# Patient Record
Sex: FEMALE | Race: WHITE | NOT HISPANIC OR LATINO | URBAN - METROPOLITAN AREA
[De-identification: names, ages, dates, MRNs, and addresses within clinical notes are randomized per-mention and may not be internally consistent; named-entity substitution may affect disease eponyms.]

---

## 2021-11-02 ENCOUNTER — TRANSFERRED RECORDS (OUTPATIENT)
Dept: HEALTH INFORMATION MANAGEMENT | Facility: CLINIC | Age: 56
End: 2021-11-02
Payer: OTHER GOVERNMENT

## 2021-11-12 ENCOUNTER — TRANSCRIBE ORDERS (OUTPATIENT)
Dept: OTHER | Age: 56
End: 2021-11-12
Payer: OTHER GOVERNMENT

## 2021-11-12 ENCOUNTER — TELEPHONE (OUTPATIENT)
Dept: GASTROENTEROLOGY | Facility: CLINIC | Age: 56
End: 2021-11-12
Payer: OTHER GOVERNMENT

## 2021-11-12 NOTE — TELEPHONE ENCOUNTER
[facility] Call Center    Phone Message    May a detailed message be left on voicemail: yes     Reason for Call: Other:     Nate is calling back trying to talk to Brandie again on Dr Lacy team.  Please call back when you are able.    Action Taken: Message routed to:  [facility] ([facility]): kip umanzor    Travel Screening: Not Applicable

## 2021-11-12 NOTE — TELEPHONE ENCOUNTER
Health Call Center    Phone Message    May a detailed message be left on voicemail: yes     Reason for Call: Other: Brian Schoenberger PA is a hospitalist in [place] and he called to set up an appt for patient with Dr. Almonte for acute pancreatitis.  He stated that Dr. Almonte is aware of patient.  Please follow up with him at [phone] for scheduling, ASAP, per Nate.  Thank you.     Action Taken: Message routed to:  [facility]): Bella Black Team     Travel Screening: Not Applicable

## 2021-11-12 NOTE — TELEPHONE ENCOUNTER
Returned call to PA wanting ASAP case. Gage is talking to current attending Dr. Muller     Returned call to Jono, directing him to our referral line, number provided.      Paulbon says he's discharged her today with on a full liquid diet and a PICC line due to pancreatitis. Will watch for referral.    ML

## 2021-11-12 NOTE — TELEPHONE ENCOUNTER
[facility]     Referring provider: [facility], Brian Schoeneberger   P: [phone]    Referred to: [facility] Provider [facility]     Provider Requested: Gage discussed with Nate while attending, please discuss with current on-service provider     Referral Received: 11/12/21     Records received: CareEverywhere  Lipase > 30,000 on 11/6, 313 on 11/12    MRCP 11/9/21  IMPRESSION:   1.  No intrahepatic or extrahepatic biliary duct dilation.   2.  Small amount of fluid left adjacent to the left hepatic lobe and in the   gallbladder fossa.  Could be due to postoperative seroma or infection or   bile leak.  These fluid collections are unchanged compared to CT from   November 03, 2021.    CT Abd/pelvis 11/3/21  IMPRESSION:   1.  Increased fluid in the gallbladder fossa and along the inferior margin   of the left hepatic lobe. There is a surgical drain which does not appear   to go deep enough to address this accumulating fluid    CT abd/pelvis 11/2/21  IMPRESSION:   1.  A surgical drain is seen in the region of the candido hepatis.  There is   mild adjacent inflammation   2.  New moderate amount of free fluid in the right lower quadrant adjacent   to the small bowel loops and ovary.     Images received: PACs    Evaluation for: Recurrent pancreatitis     Clinical History (per RN review):   Joanna Tavera  is a 56-year-old female who presented to the [facility] emergency department on 11/1 with complaints of epigastric abdominal pain and nausea.  Additionally, she noted diarrhea at that time but that has resolved since. Of significance, the patient recently was admitted to [place] from 10/22-10/26 due to acute gallstone pancreatitis requiring a laparoscopic cholecystectomy by Dr. Poe on 10/24. Findings from that operation revealed distended and minimally inflamed gallbladder containing small stones, severe fatty liver and inflammation limiting visibility with subtotal cholecystectomy  performed.  Gastroenterology, Dr. Clayton, was consulted however due to the patient's history of Alon-en-Y bypass and an ERCP was unable to be completed.  Post procedure, the patient was noted to have LFTs and lipase trending down and the diet was advanced without any serious complications.  BLANQUITA drain remained in place at the time of discharge and is currently still in place with serious fluid in the bulb and on the dressing.  Additionally, she was discharged on Ceftin and metronidazole for a total of 14 days antibiotic therapy. Since discharge patient had several bouts of recurrent pancreatitis when trying to eat solid foods.    She overall feels rather comfortable right now, but understands with her recurrent bouts that we are awaiting transfer to [facility] for specialized ERCP given her history of Alon-en-Y bypass.    -Antibiotic coverage with PO Clindamycin day #5.  -Lipase has normalized.  -LFT have been trending down/normalizing.  -[facility],  Dr. Almonte has accepted patient for specialized ERCP intervention. Phone #[phone] for transfer line. Admitting hospitalist Dr. Duncan.  familiar with case is Archana. CT of abdomen, abdominal US, & MRCP images were all pushed to [facility].  -Placed on wait-list successfully 11/9/21. Did call today to see if any beds; awaiting call back.    MD review date:   MD Decision for clinic consultation/Orders:            Referral updates/Patient contacted:

## 2021-11-16 ENCOUNTER — PATIENT OUTREACH (OUTPATIENT)
Dept: GASTROENTEROLOGY | Facility: CLINIC | Age: 56
End: 2021-11-16
Payer: OTHER GOVERNMENT

## 2021-11-16 NOTE — PROGRESS NOTES
Called pt to discuss referral and Dr Almonte's recommendations for clinic visit. Pt in agreement with this plan, would like to schedule in person visit on 1/12   Message sent to clinic coordinators    Jennifer Matthew RN, BSN,   [facility]  Care coordinator

## 2021-11-23 ENCOUNTER — TELEPHONE (OUTPATIENT)
Dept: GASTROENTEROLOGY | Facility: CLINIC | Age: 56
End: 2021-11-23
Payer: OTHER GOVERNMENT

## 2022-01-07 ENCOUNTER — PATIENT OUTREACH (OUTPATIENT)
Dept: GASTROENTEROLOGY | Facility: CLINIC | Age: 57
End: 2022-01-07
Payer: OTHER GOVERNMENT

## 2022-01-07 NOTE — PROGRESS NOTES
Called pt to discuss upcoming visit with Dr Almonte on 1/12. Pt asked if it could be changed to virtual visit, will move her visit up to 7:40am to accommodate this request. Clinic coordinator contacted to assist with time change    Jennifer Matthew RN, BSN,   [facility]  Care coordinator

## 2022-01-10 NOTE — TELEPHONE ENCOUNTER
RECORDS STATUS - ALL OTHER DIAGNOSIS      RECORDS RECEIVED FROM: [facility]   DATE RECEIVED: 1/10   NOTES STATUS DETAILS   OFFICE NOTE from referring provider CE - [facility] 12/10/21   OFFICE NOTE from medical oncologist NA    DISCHARGE SUMMARY from hospital CE - [facility] 11/6/21, 11/1/21, 10/22/21   DISCHARGE REPORT from the ER CE - [facility] 10/22/21   OPERATIVE REPORT CE - [facility] 10/24/21: LAPAROSCOPIC CHOLECYSTECTOMY  5/7/13: Alon-en-Y gastric bypass anatomy   MEDICATION LIST [facility]   CLINICAL TRIAL TREATMENTS TO DATE     LABS     PATHOLOGY REPORTS [facility] 10/24/21: [id number]  5/7/13: [id number]   ANYTHING RELATED TO DIAGNOSIS Epic/CE 11/18/21   GENONOMIC TESTING     TYPE:     IMAGING (NEED IMAGES & REPORT)     CT SCANS PACS 11/3/21, 11/2/21, 10/22/21: [facility]   MRI PACS 11/9/21: [facility]   MAMMO     ULTRASOUND PACS 11/1/21: [facility]   PET

## 2022-01-12 ENCOUNTER — VIRTUAL VISIT (OUTPATIENT)
Dept: GASTROENTEROLOGY | Facility: CLINIC | Age: 57
End: 2022-01-12
Attending: INTERNAL MEDICINE
Payer: OTHER GOVERNMENT

## 2022-01-12 ENCOUNTER — PRE VISIT (OUTPATIENT)
Dept: GASTROENTEROLOGY | Facility: CLINIC | Age: 57
End: 2022-01-12
Payer: OTHER GOVERNMENT

## 2022-01-12 DIAGNOSIS — K85.10 ACUTE BILIARY PANCREATITIS WITHOUT INFECTION OR NECROSIS: ICD-10-CM

## 2022-01-12 DIAGNOSIS — K85.90 ACUTE PANCREATITIS: Primary | ICD-10-CM

## 2022-01-12 PROCEDURE — 99204 OFFICE O/P NEW MOD 45 MIN: CPT | Mod: 95 | Performed by: INTERNAL MEDICINE

## 2022-01-12 PROCEDURE — G0463 HOSPITAL OUTPT CLINIC VISIT: HCPCS | Mod: PN,RTG | Performed by: INTERNAL MEDICINE

## 2022-01-12 RX ORDER — ENALAPRIL MALEATE 20 MG/1
TABLET ORAL
COMMUNITY
Start: 2021-12-22

## 2022-01-12 RX ORDER — METOPROLOL SUCCINATE 100 MG/1
TABLET, EXTENDED RELEASE ORAL
COMMUNITY
Start: 2021-12-22

## 2022-01-12 RX ORDER — AMLODIPINE BESYLATE 10 MG/1
TABLET ORAL
COMMUNITY
Start: 2021-12-29

## 2022-01-12 RX ORDER — CLINDAMYCIN HCL 300 MG
CAPSULE ORAL
COMMUNITY
Start: 2021-11-06

## 2022-01-12 RX ORDER — LEVOTHYROXINE SODIUM 112 UG/1
TABLET ORAL
COMMUNITY
Start: 2021-12-22

## 2022-01-12 NOTE — LETTER
Date:January 27, 2022      Patient was self referred, no letter generated. Do not send.        [facility] Health Information

## 2022-01-12 NOTE — NURSING NOTE
Patient verified meds and allergies are correct via patients echeck in.    Herberth Davenport, Virtual Facilitator

## 2022-01-12 NOTE — LETTER
1/12/2022         RE: Joanna Tavera  [address] [place] [id number]        Dear Colleague,    Thank you for referring your patient, Joanna Tavera, to the [facility]. Please see a copy of my visit note below.    Mckenzie is a 56 year old who is being evaluated via a billable video visit.      How would you like to obtain your AVS? MyChart  If the video visit is dropped, the invitation should be resent by: Text to cell phone: [phone]   Will anyone else be joining your video visit? Yes: Alisa. How would they like to receive their invitation? Text to cell phone: in person      Video Start Time: 7:44 AM  Video-Visit Details    Type of service:  Video Visit    Video End Time:8:08 AM    Originating Location (pt. Location): Home    Distant Location (provider location):  [facility]     Platform used for Video Visit: [facility]     INTERVENTIONAL ENDOSCOPY OUTPATIENT CLINIC CONSULT  DATE OF SERVICE: 1/12/2022  PROVIDER REQUESTING CONSULT: [facility], Brian Schoeneberger  Reason for Consultation: h/o acute biliary pancreatitis; steatorrhea, early satiety     ASSESSMENT:  Mckenzie is a 56 year old female with a PMhx of obesity s/p RYGB in 2013, ALCIDES, who was referred for follow up after an single episode of acute biliary pancreatitis following a cholecystectomy now complaining of steatorrhea, early satiety and lower abdominal discomfort. I reviewed her hospital course at [facility], her labs, and MRI/MRCP. Her presentation there was consistent with a dropped stone/sludge after her cholecystectomy resulting in biliary pancreatitis. Her MRI/MRCP did not show any retained choledocholithiasis or CBD sludge. I measure her CBD at that time to be 4 mm. Therefore there is no indication for an ERCP for any biliary intervention/prevent recurrence.     Her residual symptoms could be that of pancreatic insufficiency or potentially bile salt diarrhea following  cholecystectomy. Single episodes of severe acute interstitial pancreatitis can result in chronic pancreatitis changes. Alternatively, pancreatic fluid collections following a bout of pancreatitis can also result in pancreatic insufficiency due to obstruction. Her imaging did not show pancreatic necrosis so it seems less likely. We'll obtain a fecal elastase and a secretin-MRI/MRCP to assess for the above. If no signs of pancreatic insufficiency, then will do empiric cholestyramine therapy.     RECOMMENDATIONS:  - Fecal elastase, CBC, CMP  - secretin-MRI/MRCP   - RTC afterwards    Thank you for this consultation.  It was a pleasure to participate in the care of this patient; please contact us with any further questions.  A total of 45 minutes was spent in evaluation with this patient, of which was included chart review, history and exam, documentation, counseling, coordinating a management plan and further activities as noted above for this patient on the date of the encounter.     Marco Almonte MD  [facility]  Division of Gastroenterology and Hepatology  [facility] [address] [address]    ________________________________________________________________  HPI:  Mckenzie is a 56 year old female with a PMhx of obesity s/p RYGB in 2013, OSA, who was referred for follow up after an episode of acute biliary pancreatitis following a cholecystectomy.  She underwent a cholecystectomy on October 24, 2021.  It was apparently a partial cholecystectomy presumably because of inflammation.  She was readmitted to [facility] in [place] on November 6 with severe abdominal pain and lipase greater than 30,000.  Her course was lengthy and she had a roughly 2-week hospital stay.  She had some mildly elevated LFTs initially.  She had an MRI MRCP that showed a normal common bile duct and no evidence of choledocholithiasis.  She was ultimately discharged with near normal  transaminases and a mildly elevated alkaline phosphatase to 160. At this point over two months later, she is mostly pain free but notes oily stools, early satiety, and at time lower abdominal discomfort. Her main complaint is that of 4-5 BMs per day that are loose and greasy. This is her only episode of acute pancreatitis. Her weight is stable. Denies any jaundice, fevers. She does report that she has memory issues at times.     PMHx:    Anemia, normocytic normochromic     Disease of thyroid gland     Hypertension     Obesity     ALCIDES (obstructive sleep apnea)       PSurgHx:    APPENDECTOMY     CHOLECYSTECTOMY     COLONOSCOPY age 47   polyps     COLONOSCOPY 20/2021   [facility]     GASTRIC BYPASS,OBESE<100CM CHRISTINA-EN-Y 05/07/2013   Dr. Turpin [facility], done at [facility]     HYSTERECTOMY     JOINT REPLACEMENT       MEDS:  Current Outpatient Medications   Medication     amLODIPine (NORVASC) 10 MG tablet     cholecalciferol 25 MCG (1000 UT) TABS     clindamycin (CLEOCIN) 300 MG capsule     enalapril (VASOTEC) 20 MG tablet     FLUoxetine (PROZAC) 20 MG capsule     levothyroxine (SYNTHROID/LEVOTHROID) 112 MCG tablet     metoprolol succinate ER (TOPROL-XL) 100 MG 24 hr tablet     omeprazole (PRILOSEC) 20 MG DR capsule     No current facility-administered medications for this visit.     ALLERGIES:    Allergies   Allergen Reactions     Penicillins Hives, Rash and Swelling     Hand and feet swelling       Glipizide GI Disturbance and Nausea and Vomiting     FHx: no family history of pancreatitis; Aunt with pancreatic cancer    SOCIAL Hx:  Social History     Socioeconomic History     Marital status:      Spouse name: Not on file     Number of children: Not on file     Years of education: Not on file     Highest education level: Not on file   Occupational History     Not on file   Tobacco Use     Smoking status: Not on file     Smokeless tobacco: Not on file   Substance and Sexual Activity     Alcohol use: Not on  file     Drug use: Not on file     Sexual activity: Not on file   Other Topics Concern     Not on file   Social History Narrative     Not on file     Social Determinants of Health     Financial Resource Strain: Not on file   Food Insecurity: Not on file   Transportation Needs: Not on file   Physical Activity: Not on file   Stress: Not on file   Social Connections: Not on file   Intimate Partner Violence: Not on file   Housing Stability: Not on file       ROS: A comprehensive Review of Systems was asked and answered in the negative unless specifically commented upon in the HPI    Physical Exam  Gen: A&Ox3, NAD  HEENT: Moist mucus membranes, no scleral icterus.  Lungs: no respiratory distress      LABS:    Component 11/18/2021 11/18/2021 11/12/2021 11/12/2021 11/11/2021 11/11/2021 11/10/2021 11/10/2021 11/10/2021 11/09/2021 11/09/2021 11/07/2021 11/07/2021 11/06/2021 11/06/2021 11/06/2021 11/06/2021 11/06/2021 11/06/2021 11/05/2021 11/05/2021 11/05/2021 11/04/2021 11/04/2021 11/03/2021 11/03/2021 11/03/2021 11/03/2021 11/03/2021 11/02/2021 11/01/2021 11/01/2021 10/26/2021 10/26/2021 10/26/2021 10/25/2021 10/25/2021 10/24/2021 10/24/2021 10/23/2021 10/23/2021 10/23/2021 10/23/2021 10/22/2021 10/22/2021                                                   SODIUM 138 -- 139 -- 141 -- 142 -- -- -- 140 144 -- 140 -- -- -- 141 -- -- 139 -- -- 142 -- 141 -- -- 142 137 139 -- 141 -- -- 139 -- 138 -- 139 -- -- -- 142 --   POTASSIUM 5.1 -- 4.6 -- 3.8 -- 3.9 -- -- -- 3.8 4.2 -- 3.5 -- 4.1 -- 3.5 -- -- 3.5 -- -- 3.9 -- 3.9 4.5 -- 5.7 High     5.4 High     3.4 Low     -- 4.5 -- -- 4.3 -- 4.1 -- 4.3 -- -- -- 4.0 --   CHLORIDE S/P/B 101 -- 105 -- 105 -- 106 -- -- -- 105 107 -- 106 -- -- -- 105 -- -- 107 -- -- 110 High     -- 108 High     -- -- 111 High     108 High     106 -- 113 High     -- -- 112 High     -- 111 High     -- 110 High     -- -- -- 109 High     --   CO2 28 -- 27.3 -- 25.4 -- 27.4 -- -- -- 26.3 26.9 -- 26.5 -- -- -- 27.7  -- -- 25.5 -- -- 23.0 -- 22.9 -- -- 21.7 20.6 Low     24.9 -- 27.0 -- -- 23.0 -- 23.0 -- 26.0 -- -- -- 25.2 --   ALBUMIN S/P/B 3.6 -- 2.5 Low     -- 2.5 Low     -- 2.6 Low     -- -- -- 2.4 Low     -- -- 2.6 Low     -- -- -- 2.3 Low     -- -- 2.3 Low     -- -- 2.5 Low     -- 2.7 Low     -- -- 2.3 Low     2.9 Low     3.1 -- 2.3 Low     -- -- 2.6 Low     -- 2.6 Low     -- 2.6 Low     -- 2.6 Low     -- 3.1 --   ALKALINE PHOSPHATASE S/P/B 160 High     -- 201 High     -- 221 High     -- 265 High     -- -- -- 276 High     -- -- 461 High     -- -- -- 145 High     -- -- 161 High     -- -- 183 High     -- 209 High     -- -- 315 High     439 High     466 High     -- 107 -- -- 134 High     -- 144 High     -- 106 -- 106 -- 114 --   AST 45 High     -- 29 -- 28 -- 32 -- -- -- 48 High     -- -- 319 High     -- -- -- 28 -- -- 30 -- -- 44 High     -- 59 High     -- -- 126 High     317 High     526 High     -- 52 High     -- -- 96 High     -- 166 High     -- 164 High     -- 164 High     -- 162 High     --   ALT 30 -- 38 -- 38 -- 51 -- -- -- 60 High     -- -- 112 High     -- -- -- 43 -- -- 54 -- -- 72 High     -- 92 High     -- -- 135 High     199 High     204 High     -- 78 High     -- -- 121 High     -- 154 High     -- 118 High     -- 118 High     -- 82 High     --   BILIRUBIN TOTAL S/P/B 0.5 -- 0.2 -- 0.3 -- 0.4 -- -- -- 0.4 -- -- 1.2 High     -- -- -- 0.6 -- -- 0.6 -- -- 0.5 -- 0.6 -- -- 0.7 1.2 High     1.2 High     -- 0.4 -- -- 0.4 -- 0.8 -- 1.8 High     -- 1.8 High     -- 0.7 --   BUN 16 -- 19 High     -- 15 -- 8 -- -- -- 5 Low     11 -- 10 -- -- -- 8 -- -- 8 -- -- 15 -- 19 High     -- -- 22 High     18 16 -- 6 Low     -- -- 9 -- 7 -- 11 -- -- -- 16 --   CALCIUM 9.4 -- 8.4 Low     -- 8.0 Low     -- 8.9 -- -- -- 7.9 Low     8.3 Low     -- 8.3 Low     -- -- -- 7.8 Low     -- -- 7.7 Low     -- -- 7.6 Low     -- 7.5 Low     -- -- 7.7 Low     7.9 Low     8.5 -- 7.8 Low     -- -- 7.9 Low     -- 7.8 Low     -- 8.1 Low     -- -- -- 8.6  --   CREATININE S/P/B 0.80 -- 0.74 -- 0.75 -- 0.83 -- -- -- 0.73 0.88 -- 0.90 -- -- -- 0.66 -- -- 0.66 -- -- 0.75 -- 0.91 -- -- 1.22 High     1.06 High     1.03 High     -- 0.70 -- -- 0.66 -- 0.58 -- 0.82 -- -- -- 0.86 --   GLUCOSE 95 -- 132 High      -- 132 High      -- 143 High      -- -- -- 86 99 -- 114 High      -- -- -- 93 -- -- 87 -- -- 85 -- 99 -- -- 124 High      200 High      186 High      -- 76 -- -- 92 -- 75 -- 87 -- -- -- 98 --   TOTAL PROTEIN S/P/B 6.6 -- 6.7 -- 6.5 -- 6.9 -- -- -- 6.2 Low     -- -- 6.6 -- -- -- 6.0 Low     -- -- 5.8 Low     -- -- 5.7 Low     -- 5.9 Low     -- -- 6.1 Low     6.7 6.9 -- 5.5 Low     -- -- 6.1 Low     -- 6.0 Low     -- 6.1 Low     -- 6.1 Low     -- 6.8 --   ANION GAP 14 -- 6.7 -- 10.6 -- 8.6 -- -- -- 8.7 10.1 -- 7.5 -- -- -- 8.3 -- -- 6.5 -- -- 9.0 -- 10.1 -- -- 9.3 8.4 8.1 -- 1.0 Low     -- -- 4.0 Low     -- 4.0 Low     -- 3.0 Low     -- -- -- 7.8 --   eGFR Non-Afr. Amer. -- -- >90 -- >90 -- 80 Low     -- -- -- >90 74 Low     -- 72 Low     -- -- -- >90 -- -- >90 -- -- >90 -- 72 Low     -- -- 50 Low     59 Low     62 Low     -- >90 -- -- >90 -- >90 -- 81 Low     -- -- -- 77 Low     --   eGFR Afr. Amer. -- -- >90 -- >90 -- >90 -- -- -- >90 86 Low      -- 84 Low      -- -- -- >90 -- -- >90 -- -- >90 -- 83 Low      -- -- 58 Low      69 Low      71 Low      -- >90 -- -- >90 -- >90 -- >90 -- -- -- 89 Low      --   LIPASE -- 387 -- 313 -- 272 -- -- -- 268 -- -- 8056 High     -- >67387 High     -- 355 -- -- 363 -- -- 424 High     -- -- -- -- 3242 High     -- -- -- >45550 High     -- -- -- -- -- -- 714 High     -- -- -- 3579 High     -- >29287 High       MAGNESIUM -- -- -- -- -- -- -- -- 2.1 -- -- -- -- -- -- -- -- -- 1.9 -- -- -- -- -- -- -- -- -- -- -- -- -- -- 1.9 -- -- 1.9 -- -- -- 2.2 -- -- -- --   BILIRUBIN DIRECT S/P/B -- -- -- -- -- -- -- -- -- -- -- -- -- -- -- -- -- -- -- -- -- -- -- -- -- -- -- -- -- -- -- -- -- -- -- -- -- -- -- -- -- 1.3 High     -- -- --   PHOSPHORUS  -- -- -- -- -- -- -- 4.7 -- -- -- -- -- -- -- -- -- -- -- -- -- -- -- -- -- -- -- -- -- -- -- -- -- -- 2.4 Low     -- -- -- -- -- -- -- -- -- --   AMMONIA -- -- -- -- -- -- -- -- -- -- -- -- -- -- -- -- -- -- -- -- -- -- -- -- <10 Low     -- -- -- -- -- -- -- -- -- -- -- -- -- -- -- -- -- -- -- --   CALCIUM, IONIZED -- -- -- -- -- -- -- -- -- -- -- -- -- -- -- -- -- -- -- -- -- 4.6 -- -- -- -- -- -- -- -- -- -- -- -- -- -- -- -- -- -- -- -- -- -- --   GFR ESTIMATE 79 -- -- -- -- -- -- -- -- -- -- -- -- -- -- -- -- -- -- -- -- -- -- -- -- -- -- -- -- -- -- -- -- -- -- -- -- -- -- -- -- -- -- -- --   BUN CREATININE RATIO 20.00 -- --                                                   IMAGING:  CT ABDOMEN AND PELVIS WITH CONTRAST     Clinical indication: History of hysterectomy with increased output from the   patient's surgical drain.     Technique: Automated exposure control was utilized to reduce radiation   dose.  The patient received an IV dose of 100 cc [id number].     Comparison is made to a prior examination November 2, 2021.     FINDINGS: A surgical drain is identified terminating near the gallbladder   fossa. The drain appears to terminate prior to an area of inflammation and   a small volume of fluid seen in the gallbladder fossa along the inferior   margin of the liver. This fluid collection has mildly increased from the   previous study.     There is trace free fluid in the pelvis which has decreased from the prior   study. The bowel is normal in course and caliber.     The lung bases demonstrate atelectasis which is similar to the prior study.   The spleen, liver parenchyma, pancreas, and adrenal glands are   unremarkable. The kidneys are symmetric in size and enhancement with no   hydronephrosis.     IMPRESSION:     1.  Increased fluid in the gallbladder fossa and along the inferior margin   of the left hepatic lobe. There is a surgical drain which does not appear   to go deep enough to address this  accumulating fluid.        MRCP     INDICATION:  Status post laparoscopic cholecystectomy.  Evaluate for   retained stone.     COMPARISON: CT abdomen from November 03, 2021.     TECHNIQUE: T1, T2, diffusion and MRCP images are obtained of the abdomen.     FINDINGS: A small amount of fluid adjacent to the left hepatic lobe and in   the gallbladder fossa.     Liver: No discrete hepatic mass identified.     Spleen: Normal.     Pancreas: Normal.     Kidneys: Small right renal cortical cyst.     Bile ducts: Intrahepatic and extrahepatic bile ducts not dilated.  No   filling defects identified.     IMPRESSION:     1.  No intrahepatic or extrahepatic biliary duct dilation.   2.  Small amount of fluid left adjacent to the left hepatic lobe and in the   gallbladder fossa.  Could be due to postoperative seroma or infection or   bile leak.  These fluid collections are unchanged compared to CT from   November 03, 2021.            Again, thank you for allowing me to participate in the care of your patient.        Sincerely,        Marco Almonte MD

## 2022-01-12 NOTE — PROGRESS NOTES
Mckenzie is a 56 year old who is being evaluated via a billable video visit.      How would you like to obtain your AVS? MyChart  If the video visit is dropped, the invitation should be resent by: Text to cell phone: [phone]   Will anyone else be joining your video visit? Yes: Alisa. How would they like to receive their invitation? Text to cell phone: in person      Video Start Time: 7:44 AM  Video-Visit Details    Type of service:  Video Visit    Video End Time:8:08 AM    Originating Location (pt. Location): Home    Distant Location (provider location):  [facility]     Platform used for Video Visit: [facility]     INTERVENTIONAL ENDOSCOPY OUTPATIENT CLINIC CONSULT  DATE OF SERVICE: 1/12/2022  PROVIDER REQUESTING CONSULT: [facility], [facility]  Reason for Consultation: h/o acute biliary pancreatitis; steatorrhea, early satiety     ASSESSMENT:  Mckenzie is a 56 year old female with a PMhx of obesity s/p RYGB in 2013, ALCIDES, who was referred for follow up after an single episode of acute biliary pancreatitis following a cholecystectomy now complaining of steatorrhea, early satiety and lower abdominal discomfort. I reviewed her hospital course at [facility], her labs, and MRI/MRCP. Her presentation there was consistent with a dropped stone/sludge after her cholecystectomy resulting in biliary pancreatitis. Her MRI/MRCP did not show any retained choledocholithiasis or CBD sludge. I measure her CBD at that time to be 4 mm. Therefore there is no indication for an ERCP for any biliary intervention/prevent recurrence.     Her residual symptoms could be that of pancreatic insufficiency or potentially bile salt diarrhea following cholecystectomy. Single episodes of severe acute interstitial pancreatitis can result in chronic pancreatitis changes. Alternatively, pancreatic fluid collections following a bout of pancreatitis can also result in pancreatic insufficiency due to obstruction.  Her imaging did not show pancreatic necrosis so it seems less likely. We'll obtain a fecal elastase and a secretin-MRI/MRCP to assess for the above. If no signs of pancreatic insufficiency, then will do empiric cholestyramine therapy.     RECOMMENDATIONS:  - Fecal elastase, CBC, CMP  - secretin-MRI/MRCP   - RTC afterwards    Thank you for this consultation.  It was a pleasure to participate in the care of this patient; please contact us with any further questions.  A total of 45 minutes was spent in evaluation with this patient, of which was included chart review, history and exam, documentation, counseling, coordinating a management plan and further activities as noted above for this patient on the date of the encounter.     Marco Almonte MD  [facility]  Division of Gastroenterology and Hepatology  [facility] [address] [address]    ________________________________________________________________  HPI:  Mckenzie is a 56 year old female with a PMhx of obesity s/p RYGB in 2013, ALCIDES, who was referred for follow up after an episode of acute biliary pancreatitis following a cholecystectomy.  She underwent a cholecystectomy on October 24, 2021.  It was apparently a partial cholecystectomy presumably because of inflammation.  She was readmitted to [facility] in [place] on November 6 with severe abdominal pain and lipase greater than 30,000.  Her course was lengthy and she had a roughly 2-week hospital stay.  She had some mildly elevated LFTs initially.  She had an MRI MRCP that showed a normal common bile duct and no evidence of choledocholithiasis.  She was ultimately discharged with near normal transaminases and a mildly elevated alkaline phosphatase to 160. At this point over two months later, she is mostly pain free but notes oily stools, early satiety, and at time lower abdominal discomfort. Her main complaint is that of 4-5 BMs per day that are loose and  greasy. This is her only episode of acute pancreatitis. Her weight is stable. Denies any jaundice, fevers. She does report that she has memory issues at times.     PMHx:    Anemia, normocytic normochromic     Disease of thyroid gland     Hypertension     Obesity     ALCIDES (obstructive sleep apnea)       PSurgHx:    APPENDECTOMY     CHOLECYSTECTOMY     COLONOSCOPY age 47   polyps     COLONOSCOPY 20/2021   [place]     GASTRIC BYPASS,OBESE<100CM CHRISTINA-EN-Y 05/07/2013   Dr. [facility], done at [facility]     HYSTERECTOMY     JOINT REPLACEMENT       MEDS:  Current Outpatient Medications   Medication     amLODIPine (NORVASC) 10 MG tablet     cholecalciferol 25 MCG (1000 UT) TABS     clindamycin (CLEOCIN) 300 MG capsule     enalapril (VASOTEC) 20 MG tablet     FLUoxetine (PROZAC) 20 MG capsule     levothyroxine (SYNTHROID/LEVOTHROID) 112 MCG tablet     metoprolol succinate ER (TOPROL-XL) 100 MG 24 hr tablet     omeprazole (PRILOSEC) 20 MG DR capsule     No current facility-administered medications for this visit.     ALLERGIES:    Allergies   Allergen Reactions     Penicillins Hives, Rash and Swelling     Hand and feet swelling       Glipizide GI Disturbance and Nausea and Vomiting     FHx: no family history of pancreatitis; Aunt with pancreatic cancer    SOCIAL Hx:  Social History     Socioeconomic History     Marital status:      Spouse name: Not on file     Number of children: Not on file     Years of education: Not on file     Highest education level: Not on file   Occupational History     Not on file   Tobacco Use     Smoking status: Not on file     Smokeless tobacco: Not on file   Substance and Sexual Activity     Alcohol use: Not on file     Drug use: Not on file     Sexual activity: Not on file   Other Topics Concern     Not on file   Social History Narrative     Not on file     Social Determinants of Health     Financial Resource Strain: Not on file   Food Insecurity: Not on file   Transportation  Needs: Not on file   Physical Activity: Not on file   Stress: Not on file   Social Connections: Not on file   Intimate Partner Violence: Not on file   Housing Stability: Not on file       ROS: A comprehensive Review of Systems was asked and answered in the negative unless specifically commented upon in the HPI    Physical Exam  Gen: A&Ox3, NAD  HEENT: Moist mucus membranes, no scleral icterus.  Lungs: no respiratory distress      LABS:    Component 11/18/2021 11/18/2021 11/12/2021 11/12/2021 11/11/2021 11/11/2021 11/10/2021 11/10/2021 11/10/2021 11/09/2021 11/09/2021 11/07/2021 11/07/2021 11/06/2021 11/06/2021 11/06/2021 11/06/2021 11/06/2021 11/06/2021 11/05/2021 11/05/2021 11/05/2021 11/04/2021 11/04/2021 11/03/2021 11/03/2021 11/03/2021 11/03/2021 11/03/2021 11/02/2021 11/01/2021 11/01/2021 10/26/2021 10/26/2021 10/26/2021 10/25/2021 10/25/2021 10/24/2021 10/24/2021 10/23/2021 10/23/2021 10/23/2021 10/23/2021 10/22/2021 10/22/2021                                                   SODIUM 138 -- 139 -- 141 -- 142 -- -- -- 140 144 -- 140 -- -- -- 141 -- -- 139 -- -- 142 -- 141 -- -- 142 137 139 -- 141 -- -- 139 -- 138 -- 139 -- -- -- 142 --   POTASSIUM 5.1 -- 4.6 -- 3.8 -- 3.9 -- -- -- 3.8 4.2 -- 3.5 -- 4.1 -- 3.5 -- -- 3.5 -- -- 3.9 -- 3.9 4.5 -- 5.7 High     5.4 High     3.4 Low     -- 4.5 -- -- 4.3 -- 4.1 -- 4.3 -- -- -- 4.0 --   CHLORIDE S/P/B 101 -- 105 -- 105 -- 106 -- -- -- 105 107 -- 106 -- -- -- 105 -- -- 107 -- -- 110 High     -- 108 High     -- -- 111 High     108 High     106 -- 113 High     -- -- 112 High     -- 111 High     -- 110 High     -- -- -- 109 High     --   CO2 28 -- 27.3 -- 25.4 -- 27.4 -- -- -- 26.3 26.9 -- 26.5 -- -- -- 27.7 -- -- 25.5 -- -- 23.0 -- 22.9 -- -- 21.7 20.6 Low     24.9 -- 27.0 -- -- 23.0 -- 23.0 -- 26.0 -- -- -- 25.2 --   ALBUMIN S/P/B 3.6 -- 2.5 Low     -- 2.5 Low     -- 2.6 Low     -- -- -- 2.4 Low     -- -- 2.6 Low     -- -- -- 2.3 Low     -- -- 2.3 Low     -- -- 2.5 Low      -- 2.7 Low     -- -- 2.3 Low     2.9 Low     3.1 -- 2.3 Low     -- -- 2.6 Low     -- 2.6 Low     -- 2.6 Low     -- 2.6 Low     -- 3.1 --   ALKALINE PHOSPHATASE S/P/B 160 High     -- 201 High     -- 221 High     -- 265 High     -- -- -- 276 High     -- -- 461 High     -- -- -- 145 High     -- -- 161 High     -- -- 183 High     -- 209 High     -- -- 315 High     439 High     466 High     -- 107 -- -- 134 High     -- 144 High     -- 106 -- 106 -- 114 --   AST 45 High     -- 29 -- 28 -- 32 -- -- -- 48 High     -- -- 319 High     -- -- -- 28 -- -- 30 -- -- 44 High     -- 59 High     -- -- 126 High     317 High     526 High     -- 52 High     -- -- 96 High     -- 166 High     -- 164 High     -- 164 High     -- 162 High     --   ALT 30 -- 38 -- 38 -- 51 -- -- -- 60 High     -- -- 112 High     -- -- -- 43 -- -- 54 -- -- 72 High     -- 92 High     -- -- 135 High     199 High     204 High     -- 78 High     -- -- 121 High     -- 154 High     -- 118 High     -- 118 High     -- 82 High     --   BILIRUBIN TOTAL S/P/B 0.5 -- 0.2 -- 0.3 -- 0.4 -- -- -- 0.4 -- -- 1.2 High     -- -- -- 0.6 -- -- 0.6 -- -- 0.5 -- 0.6 -- -- 0.7 1.2 High     1.2 High     -- 0.4 -- -- 0.4 -- 0.8 -- 1.8 High     -- 1.8 High     -- 0.7 --   BUN 16 -- 19 High     -- 15 -- 8 -- -- -- 5 Low     11 -- 10 -- -- -- 8 -- -- 8 -- -- 15 -- 19 High     -- -- 22 High     18 16 -- 6 Low     -- -- 9 -- 7 -- 11 -- -- -- 16 --   CALCIUM 9.4 -- 8.4 Low     -- 8.0 Low     -- 8.9 -- -- -- 7.9 Low     8.3 Low     -- 8.3 Low     -- -- -- 7.8 Low     -- -- 7.7 Low     -- -- 7.6 Low     -- 7.5 Low     -- -- 7.7 Low     7.9 Low     8.5 -- 7.8 Low     -- -- 7.9 Low     -- 7.8 Low     -- 8.1 Low     -- -- -- 8.6 --   CREATININE S/P/B 0.80 -- 0.74 -- 0.75 -- 0.83 -- -- -- 0.73 0.88 -- 0.90 -- -- -- 0.66 -- -- 0.66 -- -- 0.75 -- 0.91 -- -- 1.22 High     1.06 High     1.03 High     -- 0.70 -- -- 0.66 -- 0.58 -- 0.82 -- -- -- 0.86 --   GLUCOSE 95 -- 132 High      -- 132 High       -- 143 High      -- -- -- 86 99 -- 114 High      -- -- -- 93 -- -- 87 -- -- 85 -- 99 -- -- 124 High      200 High      186 High      -- 76 -- -- 92 -- 75 -- 87 -- -- -- 98 --   TOTAL PROTEIN S/P/B 6.6 -- 6.7 -- 6.5 -- 6.9 -- -- -- 6.2 Low     -- -- 6.6 -- -- -- 6.0 Low     -- -- 5.8 Low     -- -- 5.7 Low     -- 5.9 Low     -- -- 6.1 Low     6.7 6.9 -- 5.5 Low     -- -- 6.1 Low     -- 6.0 Low     -- 6.1 Low     -- 6.1 Low     -- 6.8 --   ANION GAP 14 -- 6.7 -- 10.6 -- 8.6 -- -- -- 8.7 10.1 -- 7.5 -- -- -- 8.3 -- -- 6.5 -- -- 9.0 -- 10.1 -- -- 9.3 8.4 8.1 -- 1.0 Low     -- -- 4.0 Low     -- 4.0 Low     -- 3.0 Low     -- -- -- 7.8 --   eGFR Non-Afr. Amer. -- -- >90 -- >90 -- 80 Low     -- -- -- >90 74 Low     -- 72 Low     -- -- -- >90 -- -- >90 -- -- >90 -- 72 Low     -- -- 50 Low     59 Low     62 Low     -- >90 -- -- >90 -- >90 -- 81 Low     -- -- -- 77 Low     --   eGFR Afr. Amer. -- -- >90 -- >90 -- >90 -- -- -- >90 86 Low      -- 84 Low      -- -- -- >90 -- -- >90 -- -- >90 -- 83 Low      -- -- 58 Low      69 Low      71 Low      -- >90 -- -- >90 -- >90 -- >90 -- -- -- 89 Low      --   LIPASE -- 387 -- 313 -- 272 -- -- -- 268 -- -- 8056 High     -- >46315 High     -- 355 -- -- 363 -- -- 424 High     -- -- -- -- 3242 High     -- -- -- >46840 High     -- -- -- -- -- -- 714 High     -- -- -- 3579 High     -- >21615 High       MAGNESIUM -- -- -- -- -- -- -- -- 2.1 -- -- -- -- -- -- -- -- -- 1.9 -- -- -- -- -- -- -- -- -- -- -- -- -- -- 1.9 -- -- 1.9 -- -- -- 2.2 -- -- -- --   BILIRUBIN DIRECT S/P/B -- -- -- -- -- -- -- -- -- -- -- -- -- -- -- -- -- -- -- -- -- -- -- -- -- -- -- -- -- -- -- -- -- -- -- -- -- -- -- -- -- 1.3 High     -- -- --   PHOSPHORUS -- -- -- -- -- -- -- 4.7 -- -- -- -- -- -- -- -- -- -- -- -- -- -- -- -- -- -- -- -- -- -- -- -- -- -- 2.4 Low     -- -- -- -- -- -- -- -- -- --   AMMONIA -- -- -- -- -- -- -- -- -- -- -- -- -- -- -- -- -- -- -- -- -- -- -- -- <10 Low     -- -- -- -- -- -- -- -- -- --  -- -- -- -- -- -- -- -- -- --   CALCIUM, IONIZED -- -- -- -- -- -- -- -- -- -- -- -- -- -- -- -- -- -- -- -- -- 4.6 -- -- -- -- -- -- -- -- -- -- -- -- -- -- -- -- -- -- -- -- -- -- --   GFR ESTIMATE 79 -- -- -- -- -- -- -- -- -- -- -- -- -- -- -- -- -- -- -- -- -- -- -- -- -- -- -- -- -- -- -- -- -- -- -- -- -- -- -- -- -- -- -- --   BUN CREATININE RATIO 20.00 -- --                                                   IMAGING:  CT ABDOMEN AND PELVIS WITH CONTRAST     Clinical indication: History of hysterectomy with increased output from the   patient's surgical drain.     Technique: Automated exposure control was utilized to reduce radiation   dose.  The patient received an IV dose of 100 cc [id number].     Comparison is made to a prior examination November 2, 2021.     FINDINGS: A surgical drain is identified terminating near the gallbladder   fossa. The drain appears to terminate prior to an area of inflammation and   a small volume of fluid seen in the gallbladder fossa along the inferior   margin of the liver. This fluid collection has mildly increased from the   previous study.     There is trace free fluid in the pelvis which has decreased from the prior   study. The bowel is normal in course and caliber.     The lung bases demonstrate atelectasis which is similar to the prior study.   The spleen, liver parenchyma, pancreas, and adrenal glands are   unremarkable. The kidneys are symmetric in size and enhancement with no   hydronephrosis.     IMPRESSION:     1.  Increased fluid in the gallbladder fossa and along the inferior margin   of the left hepatic lobe. There is a surgical drain which does not appear   to go deep enough to address this accumulating fluid.        MRCP     INDICATION:  Status post laparoscopic cholecystectomy.  Evaluate for   retained stone.     COMPARISON: CT abdomen from November 03, 2021.     TECHNIQUE: T1, T2, diffusion and MRCP images are obtained of the abdomen.     FINDINGS: A small  amount of fluid adjacent to the left hepatic lobe and in   the gallbladder fossa.     Liver: No discrete hepatic mass identified.     Spleen: Normal.     Pancreas: Normal.     Kidneys: Small right renal cortical cyst.     Bile ducts: Intrahepatic and extrahepatic bile ducts not dilated.  No   filling defects identified.     IMPRESSION:     1.  No intrahepatic or extrahepatic biliary duct dilation.   2.  Small amount of fluid left adjacent to the left hepatic lobe and in the   gallbladder fossa.  Could be due to postoperative seroma or infection or   bile leak.  These fluid collections are unchanged compared to CT from   November 03, 2021.

## 2022-01-20 ENCOUNTER — TRANSFERRED RECORDS (OUTPATIENT)
Dept: HEALTH INFORMATION MANAGEMENT | Facility: CLINIC | Age: 57
End: 2022-01-20
Payer: OTHER GOVERNMENT

## 2022-02-06 ENCOUNTER — HEALTH MAINTENANCE LETTER (OUTPATIENT)
Age: 57
End: 2022-02-06

## 2022-02-28 ENCOUNTER — LAB (OUTPATIENT)
Dept: LAB | Facility: CLINIC | Age: 57
End: 2022-02-28
Attending: INTERNAL MEDICINE
Payer: OTHER GOVERNMENT

## 2022-02-28 ENCOUNTER — ANCILLARY PROCEDURE (OUTPATIENT)
Dept: MRI IMAGING | Facility: CLINIC | Age: 57
End: 2022-02-28
Attending: INTERNAL MEDICINE
Payer: OTHER GOVERNMENT

## 2022-02-28 VITALS — WEIGHT: 252 LBS

## 2022-02-28 DIAGNOSIS — K86.89 PANCREATIC INSUFFICIENCY: ICD-10-CM

## 2022-02-28 LAB
ALBUMIN SERPL-MCNC: 3 G/DL (ref 3.4–5)
ALP SERPL-CCNC: 99 U/L (ref 40–150)
ALT SERPL W P-5'-P-CCNC: 28 U/L (ref 0–50)
ANION GAP SERPL CALCULATED.3IONS-SCNC: 7 MMOL/L (ref 3–14)
AST SERPL W P-5'-P-CCNC: 23 U/L (ref 0–45)
BILIRUB SERPL-MCNC: 0.5 MG/DL (ref 0.2–1.3)
BUN SERPL-MCNC: 14 MG/DL (ref 7–30)
CALCIUM SERPL-MCNC: 8.9 MG/DL (ref 8.5–10.1)
CHLORIDE BLD-SCNC: 108 MMOL/L (ref 94–109)
CO2 SERPL-SCNC: 29 MMOL/L (ref 20–32)
CREAT SERPL-MCNC: 0.85 MG/DL (ref 0.52–1.04)
ERYTHROCYTE [DISTWIDTH] IN BLOOD BY AUTOMATED COUNT: 14.7 % (ref 10–15)
GFR SERPL CREATININE-BSD FRML MDRD: 81 ML/MIN/1.73M2
GLUCOSE BLD-MCNC: 93 MG/DL (ref 70–99)
HCT VFR BLD AUTO: 37.7 % (ref 35–47)
HGB BLD-MCNC: 12 G/DL (ref 11.7–15.7)
LIPASE SERPL-CCNC: 146 U/L (ref 73–393)
MCH RBC QN AUTO: 29 PG (ref 26.5–33)
MCHC RBC AUTO-ENTMCNC: 31.8 G/DL (ref 31.5–36.5)
MCV RBC AUTO: 91 FL (ref 78–100)
PLATELET # BLD AUTO: 225 10E3/UL (ref 150–450)
POTASSIUM BLD-SCNC: 3.9 MMOL/L (ref 3.4–5.3)
PROT SERPL-MCNC: 6.7 G/DL (ref 6.8–8.8)
RBC # BLD AUTO: 4.14 10E6/UL (ref 3.8–5.2)
SODIUM SERPL-SCNC: 144 MMOL/L (ref 133–144)
WBC # BLD AUTO: 5.8 10E3/UL (ref 4–11)

## 2022-02-28 PROCEDURE — 82653 EL-1 FECAL QUANTITATIVE: CPT | Mod: 90 | Performed by: PATHOLOGY

## 2022-02-28 PROCEDURE — 36415 COLL VENOUS BLD VENIPUNCTURE: CPT | Performed by: PATHOLOGY

## 2022-02-28 PROCEDURE — 85027 COMPLETE CBC AUTOMATED: CPT | Performed by: PATHOLOGY

## 2022-02-28 PROCEDURE — 82306 VITAMIN D 25 HYDROXY: CPT | Mod: 90 | Performed by: PATHOLOGY

## 2022-02-28 PROCEDURE — A9585 GADOBUTROL INJECTION: HCPCS | Performed by: RADIOLOGY

## 2022-02-28 PROCEDURE — 83690 ASSAY OF LIPASE: CPT | Performed by: PATHOLOGY

## 2022-02-28 PROCEDURE — 74183 MRI ABD W/O CNTR FLWD CNTR: CPT | Mod: GC | Performed by: RADIOLOGY

## 2022-02-28 PROCEDURE — 99000 SPECIMEN HANDLING OFFICE-LAB: CPT | Performed by: PATHOLOGY

## 2022-02-28 PROCEDURE — 80053 COMPREHEN METABOLIC PANEL: CPT | Performed by: PATHOLOGY

## 2022-02-28 RX ORDER — GADOBUTROL 604.72 MG/ML
15 INJECTION INTRAVENOUS ONCE
Status: COMPLETED | OUTPATIENT
Start: 2022-02-28 | End: 2022-02-28

## 2022-02-28 RX ADMIN — GADOBUTROL 11 ML: 604.72 INJECTION INTRAVENOUS at 11:50

## 2022-03-02 ENCOUNTER — VIRTUAL VISIT (OUTPATIENT)
Dept: GASTROENTEROLOGY | Facility: CLINIC | Age: 57
End: 2022-03-02
Attending: INTERNAL MEDICINE
Payer: OTHER GOVERNMENT

## 2022-03-02 DIAGNOSIS — K86.89 PANCREATIC INSUFFICIENCY: Primary | ICD-10-CM

## 2022-03-02 LAB
DEPRECATED CALCIDIOL+CALCIFEROL SERPL-MC: 35 UG/L (ref 20–75)
ELASTASE PANC STL-MCNT: 28.2 UG/G

## 2022-03-02 PROCEDURE — G0463 HOSPITAL OUTPT CLINIC VISIT: HCPCS | Mod: PN,RTG | Performed by: INTERNAL MEDICINE

## 2022-03-02 PROCEDURE — 99213 OFFICE O/P EST LOW 20 MIN: CPT | Mod: 95 | Performed by: INTERNAL MEDICINE

## 2022-03-02 NOTE — LETTER
3/2/2022         RE: Joanna Tavera  [address] [place] [id number]        Dear Colleague,    Thank you for referring your patient, Joanna Tavera, to the [facility]. Please see a copy of my visit note below.    Mckenzie is a 56 year old who is being evaluated via a billable video visit.      How would you like to obtain your AVS? MyChart  If the video visit is dropped, the invitation should be resent by: Text to cell phone: [phone]  Will anyone else be joining your video visit? Shannan Camacho VF    Video Start Time: 10:21 AM  Video-Visit Details    Type of service:  Video Visit    Video End Time:10:30 AM    Originating Location (pt. Location): Home    Distant Location (provider location):  [facility]     Platform used for Video Visit: [facility]     INTERVENTIONAL ENDOSCOPY OUTPATIENT CLINIC CONSULT  DATE OF SERVICE: 03/02/22   PROVIDER REQUESTING CONSULT: [facility], Brian Schoeneberger  Reason for Consultation: h/o acute biliary pancreatitis; steatorrhea, early satiety     ASSESSMENT:  Mckenzie is a 56 year old female with a PMhx of obesity s/p RYGB in 2013, ALCIDES, with a recent history of acute biliary pancreatitis following a cholecystectomy with residual diarrhea symptoms here for follow up. Her fecal elastase returned very low at 28 confirming pancreatic insufficiency. I reviewed her secretin- MRCP. No evidence of chronic pancreatitis seen on that study. Just some resolving inflammation from her cholecystectomy was seen.     Her prior presentation there was consistent with a dropped stone/sludge after her cholecystectomy resulting in biliary pancreatitis. Her MRI/MRCP did not show any retained choledocholithiasis or CBD sludge. Therefore there is no indication for an ERCP for any biliary intervention/prevent recurrence.     Single episodes of severe acute interstitial pancreatitis can result in chronic pancreatitis changes. We  discussed the diagnosis of pancreatic insufficiency. We'll start with pancreatic enzyme replacement therapy. It's possible that pancreatic exocrine function could return to normal the further she gets out from her surgery/acute pancreatitis episode so it's not unreasonable to recheck a fecal elastase in months. I told her that if we're not seeing any improvement at that time it's unlikely to recover in the future. She reports having been prone to low vitamin D levels in the past so we will see if we can add-on this lab to her recent blood work to see if this needs more aggressive replacement.    RECOMMENDATIONS:  - Start Creon 72k two capsules with each meal, one with snacks to start. Patient to message us if no improvement after a couple of weeks   - Add-on vitamin D level  - Repeat fecal elastase in 6 months  - RTC afterwards    Marco Almonte MD  [facility]  Division of Gastroenterology and Hepatology  [facility] [phone] [phone]    ________________________________________________________________  HPI:  Mckenzie is a 56 year old female with a PMhx of obesity s/p RYGB in 2013, ALCIDES, with a recent history of acute biliary pancreatitis following a cholecystectomy with residual diarrhea symptoms here for follow up.     Prior history is as follows:  She underwent a cholecystectomy on October 24, 2021.  It was apparently a partial cholecystectomy presumably because of inflammation.  She was readmitted to [facility] in [place] on November 6 with severe abdominal pain and lipase greater than 30,000.  Her course was lengthy and she had a roughly 2-week hospital stay.  She had some mildly elevated LFTs initially.  She had an MRI MRCP that showed a normal common bile duct and no evidence of choledocholithiasis.  She was ultimately discharged. Over two months later, she is mostly pain free but notes oily stools, early satiety, and at time lower abdominal  discomfort. Her main complaint is that of 4-5 BMs per day that are loose and greasy. This is her only episode of acute pancreatitis.       Symptoms largely unchanged from prior visit. Continues to have frequent loose bowel movements. Denies pain. Her weight is stable.     PMHx:    Anemia, normocytic normochromic     Disease of thyroid gland     Hypertension     Obesity     ALCIDES (obstructive sleep apnea)       PSurgHx:    APPENDECTOMY     CHOLECYSTECTOMY     COLONOSCOPY age 47   polyps     COLONOSCOPY    [place]     GASTRIC BYPASS,OBESE<100CM CHRISTINA-EN-Y 2013   Dr. Turpin [facility], done at [facility]     HYSTERECTOMY     JOINT REPLACEMENT       MEDS:  Current Outpatient Medications   Medication     amLODIPine (NORVASC) 10 MG tablet     amylase-lipase-protease (CREON 24) 06866-10474 units CPEP per EC capsule     cholecalciferol 25 MCG (1000 UT) TABS     clindamycin (CLEOCIN) 300 MG capsule     enalapril (VASOTEC) 20 MG tablet     FLUoxetine (PROZAC) 20 MG capsule     levothyroxine (SYNTHROID/LEVOTHROID) 112 MCG tablet     metoprolol succinate ER (TOPROL-XL) 100 MG 24 hr tablet     omeprazole (PRILOSEC) 20 MG DR capsule     No current facility-administered medications for this visit.     ALLERGIES:    Allergies   Allergen Reactions     Penicillins Hives, Rash and Swelling     Hand and feet swelling       Glipizide GI Disturbance and Nausea and Vomiting     FHx: no family history of pancreatitis; Aunt with pancreatic cancer    SOCIAL Hx:  Social History     Socioeconomic History     Marital status:      Spouse name: Not on file     Number of children: Not on file     Years of education: Not on file     Highest education level: Not on file   Occupational History     Not on file   Tobacco Use     Smoking status: Former Smoker     Quit date:      Years since quittin.1     Smokeless tobacco: Never Used   Substance and Sexual Activity     Alcohol use: Not on file     Drug use: Not on file      Sexual activity: Not on file   Other Topics Concern     Parent/sibling w/ CABG, MI or angioplasty before 65F 55M? Not Asked   Social History Narrative     Not on file     Social Determinants of Health     Financial Resource Strain: Not on file   Food Insecurity: Not on file   Transportation Needs: Not on file   Physical Activity: Not on file   Stress: Not on file   Social Connections: Not on file   Intimate Partner Violence: Not on file   Housing Stability: Not on file       ROS: A comprehensive Review of Systems was asked and answered in the negative unless specifically commented upon in the HPI    Physical Exam  Gen: A&Ox3, NAD  HEENT: Moist mucus membranes, no scleral icterus.  Lungs: no respiratory distress      LABS:    Lab on 02/28/2022   Component Date Value Ref Range Status     Elastase Fecal 02/28/2022 28.2 (A) >199.9 ug/g Final    Severe exocrine pancreatic insufficiency     Lipase 02/28/2022 146  73 - 393 U/L Final     Sodium 02/28/2022 144  133 - 144 mmol/L Final     Potassium 02/28/2022 3.9  3.4 - 5.3 mmol/L Final     Chloride 02/28/2022 108  94 - 109 mmol/L Final     Carbon Dioxide (CO2) 02/28/2022 29  20 - 32 mmol/L Final     Anion Gap 02/28/2022 7  3 - 14 mmol/L Final     Urea Nitrogen 02/28/2022 14  7 - 30 mg/dL Final     Creatinine 02/28/2022 0.85  0.52 - 1.04 mg/dL Final     Calcium 02/28/2022 8.9  8.5 - 10.1 mg/dL Final     Glucose 02/28/2022 93  70 - 99 mg/dL Final     Alkaline Phosphatase 02/28/2022 99  40 - 150 U/L Final     AST 02/28/2022 23  0 - 45 U/L Final     ALT 02/28/2022 28  0 - 50 U/L Final     Protein Total 02/28/2022 6.7 (A) 6.8 - 8.8 g/dL Final     Albumin 02/28/2022 3.0 (A) 3.4 - 5.0 g/dL Final     Bilirubin Total 02/28/2022 0.5  0.2 - 1.3 mg/dL Final     GFR Estimate 02/28/2022 81  >60 mL/min/1.73m2 Final    Effective December 21, 2021 eGFRcr in adults is calculated using the 2021 CKD-EPI creatinine equation which includes age and gender (Connie et al., NEJ, DOI:  10.1056/YUQZrg6609036)     WBC Count 02/28/2022 5.8  4.0 - 11.0 10e3/uL Final     RBC Count 02/28/2022 4.14  3.80 - 5.20 10e6/uL Final     Hemoglobin 02/28/2022 12.0  11.7 - 15.7 g/dL Final     Hematocrit 02/28/2022 37.7  35.0 - 47.0 % Final     MCV 02/28/2022 91  78 - 100 fL Final     MCH 02/28/2022 29.0  26.5 - 33.0 pg Final     MCHC 02/28/2022 31.8  31.5 - 36.5 g/dL Final     RDW 02/28/2022 14.7  10.0 - 15.0 % Final     Platelet Count 02/28/2022 225  150 - 450 10e3/uL Final       IMAGING:  CT ABDOMEN AND PELVIS WITH CONTRAST     Clinical indication: History of hysterectomy with increased output from the   patient's surgical drain.     Technique: Automated exposure control was utilized to reduce radiation   dose.  The patient received an IV dose of 100 cc Isovue-300.     Comparison is made to a prior examination November 2, 2021.     FINDINGS: A surgical drain is identified terminating near the gallbladder   fossa. The drain appears to terminate prior to an area of inflammation and   a small volume of fluid seen in the gallbladder fossa along the inferior   margin of the liver. This fluid collection has mildly increased from the   previous study.     There is trace free fluid in the pelvis which has decreased from the prior   study. The bowel is normal in course and caliber.     The lung bases demonstrate atelectasis which is similar to the prior study.   The spleen, liver parenchyma, pancreas, and adrenal glands are   unremarkable. The kidneys are symmetric in size and enhancement with no   hydronephrosis.     IMPRESSION:     1.  Increased fluid in the gallbladder fossa and along the inferior margin   of the left hepatic lobe. There is a surgical drain which does not appear   to go deep enough to address this accumulating fluid.        MRCP     INDICATION:  Status post laparoscopic cholecystectomy.  Evaluate for   retained stone.     COMPARISON: CT abdomen from November 03, 2021.     TECHNIQUE: T1, T2, diffusion  and MRCP images are obtained of the abdomen.     FINDINGS: A small amount of fluid adjacent to the left hepatic lobe and in   the gallbladder fossa.     Liver: No discrete hepatic mass identified.     Spleen: Normal.     Pancreas: Normal.     Kidneys: Small right renal cortical cyst.     Bile ducts: Intrahepatic and extrahepatic bile ducts not dilated.  No   filling defects identified.     IMPRESSION:     1.  No intrahepatic or extrahepatic biliary duct dilation.   2.  Small amount of fluid left adjacent to the left hepatic lobe and in the   gallbladder fossa.  Could be due to postoperative seroma or infection or   bile leak.  These fluid collections are unchanged compared to CT from   November 03, 2021.      Liver MRI with secretin enhanced MRCP: 2/28/2022     COMPARISON: MRCP 11/9/2021, CT abdomen and pelvis 11/2/2011     HISTORY: Recurrent pancreatitis     TECHNIQUE: Coronal T2 HASTE, sagittal T2 HASTE, axial T2 STIR, axial  PHONG T2, In-phase and Out-of-phase axial breath-hold FLASH,  diffusion-weighted, and T1-weighted VIBE axial fat saturation images  were obtained. Thick and thin slab heavily T2-weighted MRCP images  were obtained. 3-D reformatted images were created by the  technologist. Following administration of secretin, T2-weighted HASTE  images were obtained at 1 minute intervals to 7 minutes, and an  additional 10 minutes image was also obtained.      Contrast and medications: 11 mL Gadavist     FINDINGS:   MRCP:      There is no pancreas divisum. The pancreatic duct does not appear  abnormally dilated, and no sidebranches are visualized .      Pancreatic duct measures 2 mm prior to administration of secretin.  Following administration of secretin, it dilates to maximum diameter  of 4 mm.  At 10 minutes following secretin administration, the  diameter is 2 mm.     There is normal exocrine function, with contrast seen in the third  portion of the duodenum at 10 minutes following  secretin  administration.     No intra-or extra hepatic biliary dilation.  The common bile duct  measures 6 mm.      Pancreas: No focal pancreatic lesion.  Pancreas maintains normal T1  signal.     Liver: Diffuse hepatic steatosis. No abnormal iron deposition. No  suspicious focal hepatic lesion.     Gallbladder: Postsurgical change of cholecystectomy with residual  fluid noted within the pancreatic bed. Spleen: Unremarkable     Kidneys: Bilaterally symmetrically enhancing kidneys. No renal lesion.  No hydronephrosis. No hydroureter. Small T2 hyperintense cysts  bilaterally.     Adrenal glands:  Unremarkable.       Bowel: The visualized large and small bowel is normal in caliber. No  abnormal bowel wall thickening or enhancement. Postsurgical changes of  Alon-en-Y.     Lymph nodes: Multiple prominent periportal lymph nodes, for example  prominent 1.7 cm periportal lymph node series 35 image 41.     Blood vessels: Abdominal aorta and major abdominal arterial vessels  are patent and nonaneurysmal. Left retroaortic renal vein. The portal  vein is patent.     Lung bases: Unremarkable.     Bones and soft tissues: No acute osseous lesions.     Mesentery and abdominal wall: Unremarkable     Ascites: None                                                                      IMPRESSION:  1. No acute findings in the abdomen or pelvis.  2. Normal response to secretin. No pancreatic divisum.  3. Postsurgical changes of cholecystectomy with residual fluid in the  gallbladder bed.  4. Multiple periportal lymph nodes, presumably reactive.       Again, thank you for allowing me to participate in the care of your patient.        Sincerely,        Marco Almonte MD

## 2022-03-02 NOTE — LETTER
Date:March 2, 2022      Patient was self referred, no letter generated. Do not send.        [facility] Health Information

## 2022-03-02 NOTE — PROGRESS NOTES
Mckenzie is a 56 year old who is being evaluated via a billable video visit.      How would you like to obtain your AVS? MyChart  If the video visit is dropped, the invitation should be resent by: Text to cell phone: [phone]  Will anyone else be joining your video visit? Shannan      Yadira Camacho VF    Video Start Time: 10:21 AM  Video-Visit Details    Type of service:  Video Visit    Video End Time:10:30 AM    Originating Location (pt. Location): Home    Distant Location (provider location):  [facility]     Platform used for Video Visit: [facility]     INTERVENTIONAL ENDOSCOPY OUTPATIENT CLINIC CONSULT  DATE OF SERVICE: 03/02/22   PROVIDER REQUESTING CONSULT: [facility], Brian Schoeneberger  Reason for Consultation: h/o acute biliary pancreatitis; steatorrhea, early satiety     ASSESSMENT:  Mckenzie is a 56 year old female with a PMhx of obesity s/p RYGB in 2013, ALCIDES, with a recent history of acute biliary pancreatitis following a cholecystectomy with residual diarrhea symptoms here for follow up. Her fecal elastase returned very low at 28 confirming pancreatic insufficiency. I reviewed her secretin- MRCP. No evidence of chronic pancreatitis seen on that study. Just some resolving inflammation from her cholecystectomy was seen.     Her prior presentation there was consistent with a dropped stone/sludge after her cholecystectomy resulting in biliary pancreatitis. Her MRI/MRCP did not show any retained choledocholithiasis or CBD sludge. Therefore there is no indication for an ERCP for any biliary intervention/prevent recurrence.     Single episodes of severe acute interstitial pancreatitis can result in chronic pancreatitis changes. We discussed the diagnosis of pancreatic insufficiency. We'll start with pancreatic enzyme replacement therapy. It's possible that pancreatic exocrine function could return to normal the further she gets out from her surgery/acute pancreatitis episode so  it's not unreasonable to recheck a fecal elastase in months. I told her that if we're not seeing any improvement at that time it's unlikely to recover in the future. She reports having been prone to low vitamin D levels in the past so we will see if we can add-on this lab to her recent blood work to see if this needs more aggressive replacement.    RECOMMENDATIONS:  - Start Creon 72k two capsules with each meal, one with snacks to start. Patient to message us if no improvement after a couple of weeks   - Add-on vitamin D level  - Repeat fecal elastase in 6 months  - RTC afterwards    Marco Almonte MD  [facility]  Division of Gastroenterology and Hepatology  [facility] [address] [address]    ________________________________________________________________  HPI:  Mckenzie is a 56 year old female with a PMhx of obesity s/p RYGB in 2013, ALCIDES, with a recent history of acute biliary pancreatitis following a cholecystectomy with residual diarrhea symptoms here for follow up.     Prior history is as follows:  She underwent a cholecystectomy on October 24, 2021.  It was apparently a partial cholecystectomy presumably because of inflammation.  She was readmitted to [facility] in [place] on November 6 with severe abdominal pain and lipase greater than 30,000.  Her course was lengthy and she had a roughly 2-week hospital stay.  She had some mildly elevated LFTs initially.  She had an MRI MRCP that showed a normal common bile duct and no evidence of choledocholithiasis.  She was ultimately discharged. Over two months later, she is mostly pain free but notes oily stools, early satiety, and at time lower abdominal discomfort. Her main complaint is that of 4-5 BMs per day that are loose and greasy. This is her only episode of acute pancreatitis.       Symptoms largely unchanged from prior visit. Continues to have frequent loose bowel movements. Denies pain. Her weight is  stable.     PMHx:    Anemia, normocytic normochromic     Disease of thyroid gland     Hypertension     Obesity     ALCIDES (obstructive sleep apnea)       PSurgHx:    APPENDECTOMY     CHOLECYSTECTOMY     COLONOSCOPY age 47   polyps     COLONOSCOPY    [place]     GASTRIC BYPASS,OBESE<100CM CHRISTINA-EN-Y 2013   Dr. Turpin [facility], done at [facility]     HYSTERECTOMY     JOINT REPLACEMENT       MEDS:  Current Outpatient Medications   Medication     amLODIPine (NORVASC) 10 MG tablet     amylase-lipase-protease (CREON 24) 78706-89571 units CPEP per EC capsule     cholecalciferol 25 MCG (1000 UT) TABS     clindamycin (CLEOCIN) 300 MG capsule     enalapril (VASOTEC) 20 MG tablet     FLUoxetine (PROZAC) 20 MG capsule     levothyroxine (SYNTHROID/LEVOTHROID) 112 MCG tablet     metoprolol succinate ER (TOPROL-XL) 100 MG 24 hr tablet     omeprazole (PRILOSEC) 20 MG DR capsule     No current facility-administered medications for this visit.     ALLERGIES:    Allergies   Allergen Reactions     Penicillins Hives, Rash and Swelling     Hand and feet swelling       Glipizide GI Disturbance and Nausea and Vomiting     FHx: no family history of pancreatitis; Aunt with pancreatic cancer    SOCIAL Hx:  Social History     Socioeconomic History     Marital status:      Spouse name: Not on file     Number of children: Not on file     Years of education: Not on file     Highest education level: Not on file   Occupational History     Not on file   Tobacco Use     Smoking status: Former Smoker     Quit date:      Years since quittin.1     Smokeless tobacco: Never Used   Substance and Sexual Activity     Alcohol use: Not on file     Drug use: Not on file     Sexual activity: Not on file   Other Topics Concern     Parent/sibling w/ CABG, MI or angioplasty before 65F 55M? Not Asked   Social History Narrative     Not on file     Social Determinants of Health     Financial Resource Strain: Not on file   Food  Insecurity: Not on file   Transportation Needs: Not on file   Physical Activity: Not on file   Stress: Not on file   Social Connections: Not on file   Intimate Partner Violence: Not on file   Housing Stability: Not on file       ROS: A comprehensive Review of Systems was asked and answered in the negative unless specifically commented upon in the HPI    Physical Exam  Gen: A&Ox3, NAD  HEENT: Moist mucus membranes, no scleral icterus.  Lungs: no respiratory distress      LABS:    Lab on 02/28/2022   Component Date Value Ref Range Status     Elastase Fecal 02/28/2022 28.2 (A) >199.9 ug/g Final    Severe exocrine pancreatic insufficiency     Lipase 02/28/2022 146  73 - 393 U/L Final     Sodium 02/28/2022 144  133 - 144 mmol/L Final     Potassium 02/28/2022 3.9  3.4 - 5.3 mmol/L Final     Chloride 02/28/2022 108  94 - 109 mmol/L Final     Carbon Dioxide (CO2) 02/28/2022 29  20 - 32 mmol/L Final     Anion Gap 02/28/2022 7  3 - 14 mmol/L Final     Urea Nitrogen 02/28/2022 14  7 - 30 mg/dL Final     Creatinine 02/28/2022 0.85  0.52 - 1.04 mg/dL Final     Calcium 02/28/2022 8.9  8.5 - 10.1 mg/dL Final     Glucose 02/28/2022 93  70 - 99 mg/dL Final     Alkaline Phosphatase 02/28/2022 99  40 - 150 U/L Final     AST 02/28/2022 23  0 - 45 U/L Final     ALT 02/28/2022 28  0 - 50 U/L Final     Protein Total 02/28/2022 6.7 (A) 6.8 - 8.8 g/dL Final     Albumin 02/28/2022 3.0 (A) 3.4 - 5.0 g/dL Final     Bilirubin Total 02/28/2022 0.5  0.2 - 1.3 mg/dL Final     GFR Estimate 02/28/2022 81  >60 mL/min/1.73m2 Final    Effective December 21, 2021 eGFRcr in adults is calculated using the 2021 CKD-EPI creatinine equation which includes age and gender (Connie et al., NEJM, DOI: 10.1056/URVEdf5406268)     WBC Count 02/28/2022 5.8  4.0 - 11.0 10e3/uL Final     RBC Count 02/28/2022 4.14  3.80 - 5.20 10e6/uL Final     Hemoglobin 02/28/2022 12.0  11.7 - 15.7 g/dL Final     Hematocrit 02/28/2022 37.7  35.0 - 47.0 % Final     MCV 02/28/2022 91  78  - 100 fL Final     MCH 02/28/2022 29.0  26.5 - 33.0 pg Final     MCHC 02/28/2022 31.8  31.5 - 36.5 g/dL Final     RDW 02/28/2022 14.7  10.0 - 15.0 % Final     Platelet Count 02/28/2022 225  150 - 450 10e3/uL Final       IMAGING:  CT ABDOMEN AND PELVIS WITH CONTRAST     Clinical indication: History of hysterectomy with increased output from the   patient's surgical drain.     Technique: Automated exposure control was utilized to reduce radiation   dose.  The patient received an IV dose of 100 cc Isovue-300.     Comparison is made to a prior examination November 2, 2021.     FINDINGS: A surgical drain is identified terminating near the gallbladder   fossa. The drain appears to terminate prior to an area of inflammation and   a small volume of fluid seen in the gallbladder fossa along the inferior   margin of the liver. This fluid collection has mildly increased from the   previous study.     There is trace free fluid in the pelvis which has decreased from the prior   study. The bowel is normal in course and caliber.     The lung bases demonstrate atelectasis which is similar to the prior study.   The spleen, liver parenchyma, pancreas, and adrenal glands are   unremarkable. The kidneys are symmetric in size and enhancement with no   hydronephrosis.     IMPRESSION:     1.  Increased fluid in the gallbladder fossa and along the inferior margin   of the left hepatic lobe. There is a surgical drain which does not appear   to go deep enough to address this accumulating fluid.        MRCP     INDICATION:  Status post laparoscopic cholecystectomy.  Evaluate for   retained stone.     COMPARISON: CT abdomen from November 03, 2021.     TECHNIQUE: T1, T2, diffusion and MRCP images are obtained of the abdomen.     FINDINGS: A small amount of fluid adjacent to the left hepatic lobe and in   the gallbladder fossa.     Liver: No discrete hepatic mass identified.     Spleen: Normal.     Pancreas: Normal.     Kidneys: Small right  renal cortical cyst.     Bile ducts: Intrahepatic and extrahepatic bile ducts not dilated.  No   filling defects identified.     IMPRESSION:     1.  No intrahepatic or extrahepatic biliary duct dilation.   2.  Small amount of fluid left adjacent to the left hepatic lobe and in the   gallbladder fossa.  Could be due to postoperative seroma or infection or   bile leak.  These fluid collections are unchanged compared to CT from   November 03, 2021.      Liver MRI with secretin enhanced MRCP: 2/28/2022     COMPARISON: MRCP 11/9/2021, CT abdomen and pelvis 11/2/2011     HISTORY: Recurrent pancreatitis     TECHNIQUE: Coronal T2 HASTE, sagittal T2 HASTE, axial T2 STIR, axial  PHONG T2, In-phase and Out-of-phase axial breath-hold FLASH,  diffusion-weighted, and T1-weighted VIBE axial fat saturation images  were obtained. Thick and thin slab heavily T2-weighted MRCP images  were obtained. 3-D reformatted images were created by the  technologist. Following administration of secretin, T2-weighted HASTE  images were obtained at 1 minute intervals to 7 minutes, and an  additional 10 minutes image was also obtained.      Contrast and medications: 11 mL Gadavist     FINDINGS:   MRCP:      There is no pancreas divisum. The pancreatic duct does not appear  abnormally dilated, and no sidebranches are visualized .      Pancreatic duct measures 2 mm prior to administration of secretin.  Following administration of secretin, it dilates to maximum diameter  of 4 mm.  At 10 minutes following secretin administration, the  diameter is 2 mm.     There is normal exocrine function, with contrast seen in the third  portion of the duodenum at 10 minutes following secretin  administration.     No intra-or extra hepatic biliary dilation.  The common bile duct  measures 6 mm.      Pancreas: No focal pancreatic lesion.  Pancreas maintains normal T1  signal.     Liver: Diffuse hepatic steatosis. No abnormal iron deposition. No  suspicious focal hepatic  lesion.     Gallbladder: Postsurgical change of cholecystectomy with residual  fluid noted within the pancreatic bed. Spleen: Unremarkable     Kidneys: Bilaterally symmetrically enhancing kidneys. No renal lesion.  No hydronephrosis. No hydroureter. Small T2 hyperintense cysts  bilaterally.     Adrenal glands:  Unremarkable.       Bowel: The visualized large and small bowel is normal in caliber. No  abnormal bowel wall thickening or enhancement. Postsurgical changes of  Alon-en-Y.     Lymph nodes: Multiple prominent periportal lymph nodes, for example  prominent 1.7 cm periportal lymph node series 35 image 41.     Blood vessels: Abdominal aorta and major abdominal arterial vessels  are patent and nonaneurysmal. Left retroaortic renal vein. The portal  vein is patent.     Lung bases: Unremarkable.     Bones and soft tissues: No acute osseous lesions.     Mesentery and abdominal wall: Unremarkable     Ascites: None                                                                      IMPRESSION:  1. No acute findings in the abdomen or pelvis.  2. Normal response to secretin. No pancreatic divisum.  3. Postsurgical changes of cholecystectomy with residual fluid in the  gallbladder bed.  4. Multiple periportal lymph nodes, presumably reactive.

## 2022-08-24 ENCOUNTER — MYC MEDICAL ADVICE (OUTPATIENT)
Dept: GASTROENTEROLOGY | Facility: CLINIC | Age: 57
End: 2022-08-24

## 2022-10-03 ENCOUNTER — HEALTH MAINTENANCE LETTER (OUTPATIENT)
Age: 57
End: 2022-10-03

## 2022-10-12 ENCOUNTER — LAB (OUTPATIENT)
Dept: LAB | Facility: CLINIC | Age: 57
End: 2022-10-12
Payer: OTHER GOVERNMENT

## 2022-10-12 DIAGNOSIS — K86.89 PANCREATIC INSUFFICIENCY: ICD-10-CM

## 2022-10-12 PROCEDURE — 99000 SPECIMEN HANDLING OFFICE-LAB: CPT | Performed by: PATHOLOGY

## 2022-10-12 PROCEDURE — 82653 EL-1 FECAL QUANTITATIVE: CPT | Mod: 90 | Performed by: PATHOLOGY

## 2022-10-14 LAB — ELASTASE PANC STL-MCNT: 400 UG/G

## 2022-10-19 ENCOUNTER — VIRTUAL VISIT (OUTPATIENT)
Dept: GASTROENTEROLOGY | Facility: CLINIC | Age: 57
End: 2022-10-19
Attending: INTERNAL MEDICINE
Payer: OTHER GOVERNMENT

## 2022-10-19 DIAGNOSIS — K85.10 ACUTE GALLSTONE PANCREATITIS: Primary | ICD-10-CM

## 2022-10-19 PROCEDURE — 99213 OFFICE O/P EST LOW 20 MIN: CPT | Mod: 95 | Performed by: INTERNAL MEDICINE

## 2022-10-19 NOTE — PROGRESS NOTES
Mckenzie is a 57 year old who is being evaluated via a billable video visit.      How would you like to obtain your AVS? MyChart  If the video visit is dropped, the invitation should be resent by: Text to cell phone: [phone]  Will anyone else be joining your video visit? No        Video-Visit Details    Video Start Time: 7:46 AM    Type of service:  Video Visit    Video End Time:7:59 AM    Originating Location (pt. Location): Home        Distant Location (provider location):  On-site    Platform used for Video Visit: Crissy Anna    Mckenzie is a 56 year old who is being evaluated via a billable video visit.      How would you like to obtain your AVS? MyChart  If the video visit is dropped, the invitation should be resent by: Text to cell phone: [phone]  Will anyone else be joining your video visit? No        INTERVENTIONAL ENDOSCOPY OUTPATIENT CLINIC FOLLOW UP  DATE OF SERVICE: 10/19/22    PROVIDER REQUESTING CONSULT: [facility], Brian Schoeneberger  Reason for Consultation: h/o acute biliary pancreatitis; steatorrhea, early satiety     ASSESSMENT:  Mckenzie is a 57 year old female with a PMhx of obesity s/p RYGB in 2013, ALCIDES, with a history of acute biliary pancreatitis following a cholecystectomy on 10/24/21 with residual diarrhea symptoms here for follow up for pancreatic insufficiency. Her fecal elastase previously was very low at 28 seven months ago. This has now normalized. She can discontinue her Creon.     She still notes some loose stools. She had a normal colonoscopy ~1-2 years ago per the patient. Statistically then she probably has bile salt diarrhea following her cholecystectomy. I discussed doing a trial of cholestyramine and that post-chris bile salt diarrhea often does resolve itself after about a year. She would rather not start on a new medication as the loose stools are not that bothersome to her. This is reasonable and as there are no other red flags no further work up  is really needed. I offered that if she did want to do a trial of cholestyramine in the future she could message us on MyChart and I can send a prescription.    RECOMMENDATIONS:  - Discontinue Creon  - Follow up as needed    Marco Almonte MD  [facility]  Division of Gastroenterology and Hepatology  [facility] [address] [place], [place] [address]    ________________________________________________________________  HPI:  Mckenzie is a 57 year old female with a PMhx of obesity s/p RYGB in 2013, ALCIDES, with a recent history of acute biliary pancreatitis following a cholecystectomy with residual diarrhea symptoms here for follow up. Previously found to have a low fecal elastase/pancreatic insufficiency.    Prior history is as follows:  She underwent a cholecystectomy on October 24, 2021.  It was apparently a partial cholecystectomy presumably because of inflammation.  She was readmitted to [facility] in [place] on November 6 with severe abdominal pain and lipase greater than 30,000.  Her course was lengthy and she had a roughly 2-week hospital stay.  She had some mildly elevated LFTs initially.  She had an MRI MRCP that showed a normal common bile duct and no evidence of choledocholithiasis.  She was ultimately discharged. Over two months later, she is mostly pain free but notes oily stools, early satiety, and at time lower abdominal discomfort. Her main complaint is that of 4-5 BMs per day that are loose and greasy. This is her only episode of acute pancreatitis.     Fecal elastase was 28.2. We started her on Creon.    Interval history:  Recheck of fecal elastase is now normal at 400. She has continued on the Creon but still have loose stools ~4x daily. Sometimes with some urgency. It's not all that bothersome to her though. Denies pain. Her weight is stable.     PMHx:    Anemia, normocytic normochromic     Disease of thyroid gland     Hypertension     Obesity     ALCIDES  (obstructive sleep apnea)       PSurgHx:    APPENDECTOMY     CHOLECYSTECTOMY     COLONOSCOPY age 47   polyps     COLONOSCOPY    [place]     GASTRIC BYPASS,OBESE<100CM CHRISTINA-EN-Y 2013   Dr. Turpin [facility] [place], done at [facility]     HYSTERECTOMY     JOINT REPLACEMENT       MEDS:  Current Outpatient Medications   Medication     amLODIPine (NORVASC) 10 MG tablet     cholecalciferol 25 MCG (1000 UT) TABS     clindamycin (CLEOCIN) 300 MG capsule     enalapril (VASOTEC) 20 MG tablet     FLUoxetine (PROZAC) 20 MG capsule     levothyroxine (SYNTHROID/LEVOTHROID) 112 MCG tablet     metoprolol succinate ER (TOPROL-XL) 100 MG 24 hr tablet     omeprazole (PRILOSEC) 20 MG DR capsule     No current facility-administered medications for this visit.     ALLERGIES:    Allergies   Allergen Reactions     Penicillins Hives, Rash and Swelling     Hand and feet swelling       Glipizide GI Disturbance and Nausea and Vomiting     FHx: no family history of pancreatitis; Aunt with pancreatic cancer    SOCIAL Hx:  Social History     Socioeconomic History     Marital status:      Spouse name: Not on file     Number of children: Not on file     Years of education: Not on file     Highest education level: Not on file   Occupational History     Not on file   Tobacco Use     Smoking status: Former     Types: Cigarettes     Quit date:      Years since quittin.7     Smokeless tobacco: Never   Substance and Sexual Activity     Alcohol use: Not on file     Drug use: Not on file     Sexual activity: Not on file   Other Topics Concern     Parent/sibling w/ CABG, MI or angioplasty before 65F 55M? Not Asked   Social History Narrative     Not on file     Social Determinants of Health     Financial Resource Strain: Not on file   Food Insecurity: Not on file   Transportation Needs: Not on file   Physical Activity: Not on file   Stress: Not on file   Social Connections: Not on file   Intimate Partner Violence: Not on file    Housing Stability: Not on file       ROS: A comprehensive Review of Systems was asked and answered in the negative unless specifically commented upon in the HPI    Physical Exam  Gen: A&Ox3, NAD  HEENT: Moist mucus membranes, no scleral icterus.  Lungs: no respiratory distress      LABS:    Lab on 02/28/2022   Component Date Value Ref Range Status     Elastase Fecal 02/28/2022 28.2 (A) >199.9 ug/g Final    Severe exocrine pancreatic insufficiency     Lipase 02/28/2022 146  73 - 393 U/L Final     Sodium 02/28/2022 144  133 - 144 mmol/L Final     Potassium 02/28/2022 3.9  3.4 - 5.3 mmol/L Final     Chloride 02/28/2022 108  94 - 109 mmol/L Final     Carbon Dioxide (CO2) 02/28/2022 29  20 - 32 mmol/L Final     Anion Gap 02/28/2022 7  3 - 14 mmol/L Final     Urea Nitrogen 02/28/2022 14  7 - 30 mg/dL Final     Creatinine 02/28/2022 0.85  0.52 - 1.04 mg/dL Final     Calcium 02/28/2022 8.9  8.5 - 10.1 mg/dL Final     Glucose 02/28/2022 93  70 - 99 mg/dL Final     Alkaline Phosphatase 02/28/2022 99  40 - 150 U/L Final     AST 02/28/2022 23  0 - 45 U/L Final     ALT 02/28/2022 28  0 - 50 U/L Final     Protein Total 02/28/2022 6.7 (A) 6.8 - 8.8 g/dL Final     Albumin 02/28/2022 3.0 (A) 3.4 - 5.0 g/dL Final     Bilirubin Total 02/28/2022 0.5  0.2 - 1.3 mg/dL Final     GFR Estimate 02/28/2022 81  >60 mL/min/1.73m2 Final    Effective December 21, 2021 eGFRcr in adults is calculated using the 2021 CKD-EPI creatinine equation which includes age and gender (Connie et al., NEJM, DOI: 10.1056/FJAAgn4887083)     WBC Count 02/28/2022 5.8  4.0 - 11.0 10e3/uL Final     RBC Count 02/28/2022 4.14  3.80 - 5.20 10e6/uL Final     Hemoglobin 02/28/2022 12.0  11.7 - 15.7 g/dL Final     Hematocrit 02/28/2022 37.7  35.0 - 47.0 % Final     MCV 02/28/2022 91  78 - 100 fL Final     MCH 02/28/2022 29.0  26.5 - 33.0 pg Final     MCHC 02/28/2022 31.8  31.5 - 36.5 g/dL Final     RDW 02/28/2022 14.7  10.0 - 15.0 % Final     Platelet Count 02/28/2022 225   150 - 450 10e3/uL Final     Component Ref Range & Units 7 d ago 7 mo ago    Elastase Fecal >199.9 ug/g 400.0  28.2 Low  CM   Specimen Collected: 10/12/22 12:20 PM Last Resulted: 10/14/22  1:07 PM        IMAGING:  CT ABDOMEN AND PELVIS WITH CONTRAST     Clinical indication: History of hysterectomy with increased output from the   patient's surgical drain.     Technique: Automated exposure control was utilized to reduce radiation   dose.  The patient received an IV dose of 100 cc Isovue-300.     Comparison is made to a prior examination November 2, 2021.     FINDINGS: A surgical drain is identified terminating near the gallbladder   fossa. The drain appears to terminate prior to an area of inflammation and   a small volume of fluid seen in the gallbladder fossa along the inferior   margin of the liver. This fluid collection has mildly increased from the   previous study.     There is trace free fluid in the pelvis which has decreased from the prior   study. The bowel is normal in course and caliber.     The lung bases demonstrate atelectasis which is similar to the prior study.   The spleen, liver parenchyma, pancreas, and adrenal glands are   unremarkable. The kidneys are symmetric in size and enhancement with no   hydronephrosis.     IMPRESSION:     1.  Increased fluid in the gallbladder fossa and along the inferior margin   of the left hepatic lobe. There is a surgical drain which does not appear   to go deep enough to address this accumulating fluid.        MRCP     INDICATION:  Status post laparoscopic cholecystectomy.  Evaluate for   retained stone.     COMPARISON: CT abdomen from November 03, 2021.     TECHNIQUE: T1, T2, diffusion and MRCP images are obtained of the abdomen.     FINDINGS: A small amount of fluid adjacent to the left hepatic lobe and in   the gallbladder fossa.     Liver: No discrete hepatic mass identified.     Spleen: Normal.     Pancreas: Normal.     Kidneys: Small right renal cortical cyst.      Bile ducts: Intrahepatic and extrahepatic bile ducts not dilated.  No   filling defects identified.     IMPRESSION:     1.  No intrahepatic or extrahepatic biliary duct dilation.   2.  Small amount of fluid left adjacent to the left hepatic lobe and in the   gallbladder fossa.  Could be due to postoperative seroma or infection or   bile leak.  These fluid collections are unchanged compared to CT from   November 03, 2021.      Liver MRI with secretin enhanced MRCP: 2/28/2022     COMPARISON: MRCP 11/9/2021, CT abdomen and pelvis 11/2/2011     HISTORY: Recurrent pancreatitis     TECHNIQUE: Coronal T2 HASTE, sagittal T2 HASTE, axial T2 STIR, axial  PHONG T2, In-phase and Out-of-phase axial breath-hold FLASH,  diffusion-weighted, and T1-weighted VIBE axial fat saturation images  were obtained. Thick and thin slab heavily T2-weighted MRCP images  were obtained. 3-D reformatted images were created by the  technologist. Following administration of secretin, T2-weighted HASTE  images were obtained at 1 minute intervals to 7 minutes, and an  additional 10 minutes image was also obtained.      Contrast and medications: 11 mL Gadavist     FINDINGS:   MRCP:      There is no pancreas divisum. The pancreatic duct does not appear  abnormally dilated, and no sidebranches are visualized .      Pancreatic duct measures 2 mm prior to administration of secretin.  Following administration of secretin, it dilates to maximum diameter  of 4 mm.  At 10 minutes following secretin administration, the  diameter is 2 mm.     There is normal exocrine function, with contrast seen in the third  portion of the duodenum at 10 minutes following secretin  administration.     No intra-or extra hepatic biliary dilation.  The common bile duct  measures 6 mm.      Pancreas: No focal pancreatic lesion.  Pancreas maintains normal T1  signal.     Liver: Diffuse hepatic steatosis. No abnormal iron deposition. No  suspicious focal hepatic  lesion.     Gallbladder: Postsurgical change of cholecystectomy with residual  fluid noted within the pancreatic bed. Spleen: Unremarkable     Kidneys: Bilaterally symmetrically enhancing kidneys. No renal lesion.  No hydronephrosis. No hydroureter. Small T2 hyperintense cysts  bilaterally.     Adrenal glands:  Unremarkable.       Bowel: The visualized large and small bowel is normal in caliber. No  abnormal bowel wall thickening or enhancement. Postsurgical changes of  Alon-en-Y.     Lymph nodes: Multiple prominent periportal lymph nodes, for example  prominent 1.7 cm periportal lymph node series 35 image 41.     Blood vessels: Abdominal aorta and major abdominal arterial vessels  are patent and nonaneurysmal. Left retroaortic renal vein. The portal  vein is patent.     Lung bases: Unremarkable.     Bones and soft tissues: No acute osseous lesions.     Mesentery and abdominal wall: Unremarkable     Ascites: None                                                                      IMPRESSION:  1. No acute findings in the abdomen or pelvis.  2. Normal response to secretin. No pancreatic divisum.  3. Postsurgical changes of cholecystectomy with residual fluid in the  gallbladder bed.  4. Multiple periportal lymph nodes, presumably reactive.

## 2022-10-19 NOTE — PATIENT INSTRUCTIONS
You will find a brief summary of your discussion and care plan from today's visit below.  Dr Almonte has outlined the following steps after your recent clinic visit:    RECOMMENDATIONS:  - Discontinue Creon  - Follow up as needed    Please call with any questions or concerns regarding your clinic visit today.     It is a pleasure being involved in your health care.     Contacts post-consultation depending on your need:     Schedule Clinic Appointments                        [phone], option #5   M-F 7:30 - 5 pm    Brandie Matthew RN Care Coordinator           [phone]    OR Procedure Scheduling                              [phone]     For urgent/emergent questions after business hours, you may reach the on-call GI Fellow by contacting the [facility]  at [phone].     How do I schedule labs, imaging studies, or procedures that were ordered in clinic today?      Labs: To schedule lab appointment at the [facility], use my chart or call [phone]. If you have a [facility] lab closer to home where you are regularly seen you can give them a call.      Procedures: If a colonoscopy, upper endoscopy, breath test, esophageal manometry, or pH impedence was ordered today, our endoscopy team will call you to schedule this. If you have not heard from our endoscopy team within a week, please call [phone] to schedule.      Imaging Studies: If you were scheduled for a CT scan, X-ray, MRI, ultrasound, HIDA scan or other imaging study, please call [phone] to have this scheduled.      Referral: If a referral to another specialty was ordered, expect a phone call or follow instructions above. If you have not heard from anyone regarding your referral in a week, please call our clinic to check the status.      How to I schedule a follow-up visit?  If you did not schedule a follow-up visit today, please call [phone] option #5 to schedule a follow-up office visit.      I  recommend signing up for [employer] access if you have not already done so and are comfortable with using a computer.  This allows for online access to your lab results and also helps you communicate efficiently with the clinic should any questions arise in your care.

## 2022-10-19 NOTE — LETTER
10/19/2022         RE: Joanna Tavera  [address] [place] [id number]        Dear Colleague,    Thank you for referring your patient, Joanna Tavera, to the [facility]. Please see a copy of my visit note below.      INTERVENTIONAL ENDOSCOPY OUTPATIENT CLINIC FOLLOW UP  DATE OF SERVICE: 10/19/22    PROVIDER REQUESTING CONSULT: [facility], Brian Schoeneberger  Reason for Consultation: h/o acute biliary pancreatitis; steatorrhea, early satiety     ASSESSMENT:  Mckenzie is a 57 year old female with a PMhx of obesity s/p RYGB in 2013, ALCIDES, with a history of acute biliary pancreatitis following a cholecystectomy on 10/24/21 with residual diarrhea symptoms here for follow up for pancreatic insufficiency. Her fecal elastase previously was very low at 28 seven months ago. This has now normalized. She can discontinue her Creon.     She still notes some loose stools. She had a normal colonoscopy ~1-2 years ago per the patient. Statistically then she probably has bile salt diarrhea following her cholecystectomy. I discussed doing a trial of cholestyramine and that post-chris bile salt diarrhea often does resolve itself after about a year. She would rather not start on a new medication as the loose stools are not that bothersome to her. This is reasonable and as there are no other red flags no further work up is really needed. I offered that if she did want to do a trial of cholestyramine in the future she could message us on [employer] and I can send a prescription.    RECOMMENDATIONS:  - Discontinue Creon  - Follow up as needed    Marco Almonte MD  [facility]  Division of Gastroenterology and Hepatology  [facility] [address], [place] [address]    ________________________________________________________________  HPI:  Mckenzie is a 57 year old female with a PMhx of obesity s/p RYGB in 2013, ALCIDES, with a recent history of acute biliary  pancreatitis following a cholecystectomy with residual diarrhea symptoms here for follow up. Previously found to have a low fecal elastase/pancreatic insufficiency.    Prior history is as follows:  She underwent a cholecystectomy on October 24, 2021.  It was apparently a partial cholecystectomy presumably because of inflammation.  She was readmitted to [facility] in [place] on November 6 with severe abdominal pain and lipase greater than 30,000.  Her course was lengthy and she had a roughly 2-week hospital stay.  She had some mildly elevated LFTs initially.  She had an MRI MRCP that showed a normal common bile duct and no evidence of choledocholithiasis.  She was ultimately discharged. Over two months later, she is mostly pain free but notes oily stools, early satiety, and at time lower abdominal discomfort. Her main complaint is that of 4-5 BMs per day that are loose and greasy. This is her only episode of acute pancreatitis.     Fecal elastase was 28.2. We started her on Creon.    Interval history:  Recheck of fecal elastase is now normal at 400. She has continued on the Creon but still have loose stools ~4x daily. Sometimes with some urgency. It's not all that bothersome to her though. Denies pain. Her weight is stable.     PMHx:    Anemia, normocytic normochromic     Disease of thyroid gland     Hypertension     Obesity     ALCIDES (obstructive sleep apnea)       PSurgHx:    APPENDECTOMY     CHOLECYSTECTOMY     COLONOSCOPY age 47   polyps     COLONOSCOPY 20/2021   [facility]     GASTRIC BYPASS,OBESE<100CM CHRISTINA-EN-Y 05/07/2013   Dr. Turpin [facility], done at [facility]     HYSTERECTOMY     JOINT REPLACEMENT       MEDS:  Current Outpatient Medications   Medication     amLODIPine (NORVASC) 10 MG tablet     cholecalciferol 25 MCG (1000 UT) TABS     clindamycin (CLEOCIN) 300 MG capsule     enalapril (VASOTEC) 20 MG tablet     FLUoxetine (PROZAC) 20 MG capsule     levothyroxine (SYNTHROID/LEVOTHROID) 112 MCG  tablet     metoprolol succinate ER (TOPROL-XL) 100 MG 24 hr tablet     omeprazole (PRILOSEC) 20 MG DR capsule     No current facility-administered medications for this visit.     ALLERGIES:    Allergies   Allergen Reactions     Penicillins Hives, Rash and Swelling     Hand and feet swelling       Glipizide GI Disturbance and Nausea and Vomiting     FHx: no family history of pancreatitis; Aunt with pancreatic cancer    SOCIAL Hx:  Social History     Socioeconomic History     Marital status:      Spouse name: Not on file     Number of children: Not on file     Years of education: Not on file     Highest education level: Not on file   Occupational History     Not on file   Tobacco Use     Smoking status: Former     Types: Cigarettes     Quit date:      Years since quittin.7     Smokeless tobacco: Never   Substance and Sexual Activity     Alcohol use: Not on file     Drug use: Not on file     Sexual activity: Not on file   Other Topics Concern     Parent/sibling w/ CABG, MI or angioplasty before 65F 55M? Not Asked   Social History Narrative     Not on file     Social Determinants of Health     Financial Resource Strain: Not on file   Food Insecurity: Not on file   Transportation Needs: Not on file   Physical Activity: Not on file   Stress: Not on file   Social Connections: Not on file   Intimate Partner Violence: Not on file   Housing Stability: Not on file       ROS: A comprehensive Review of Systems was asked and answered in the negative unless specifically commented upon in the HPI    Physical Exam  Gen: A&Ox3, NAD  HEENT: Moist mucus membranes, no scleral icterus.  Lungs: no respiratory distress      LABS:    Lab on 2022   Component Date Value Ref Range Status     Elastase Fecal 2022 28.2 (A) >199.9 ug/g Final    Severe exocrine pancreatic insufficiency     Lipase 2022 146  73 - 393 U/L Final     Sodium 2022 144  133 - 144 mmol/L Final     Potassium 2022 3.9  3.4 - 5.3  mmol/L Final     Chloride 02/28/2022 108  94 - 109 mmol/L Final     Carbon Dioxide (CO2) 02/28/2022 29  20 - 32 mmol/L Final     Anion Gap 02/28/2022 7  3 - 14 mmol/L Final     Urea Nitrogen 02/28/2022 14  7 - 30 mg/dL Final     Creatinine 02/28/2022 0.85  0.52 - 1.04 mg/dL Final     Calcium 02/28/2022 8.9  8.5 - 10.1 mg/dL Final     Glucose 02/28/2022 93  70 - 99 mg/dL Final     Alkaline Phosphatase 02/28/2022 99  40 - 150 U/L Final     AST 02/28/2022 23  0 - 45 U/L Final     ALT 02/28/2022 28  0 - 50 U/L Final     Protein Total 02/28/2022 6.7 (A) 6.8 - 8.8 g/dL Final     Albumin 02/28/2022 3.0 (A) 3.4 - 5.0 g/dL Final     Bilirubin Total 02/28/2022 0.5  0.2 - 1.3 mg/dL Final     GFR Estimate 02/28/2022 81  >60 mL/min/1.73m2 Final    Effective December 21, 2021 eGFRcr in adults is calculated using the 2021 CKD-EPI creatinine equation which includes age and gender (Connie et al., NE, DOI: 10.1056/TXIQww0531336)     WBC Count 02/28/2022 5.8  4.0 - 11.0 10e3/uL Final     RBC Count 02/28/2022 4.14  3.80 - 5.20 10e6/uL Final     Hemoglobin 02/28/2022 12.0  11.7 - 15.7 g/dL Final     Hematocrit 02/28/2022 37.7  35.0 - 47.0 % Final     MCV 02/28/2022 91  78 - 100 fL Final     MCH 02/28/2022 29.0  26.5 - 33.0 pg Final     MCHC 02/28/2022 31.8  31.5 - 36.5 g/dL Final     RDW 02/28/2022 14.7  10.0 - 15.0 % Final     Platelet Count 02/28/2022 225  150 - 450 10e3/uL Final     Component Ref Range & Units 7 d ago 7 mo ago    Elastase Fecal >199.9 ug/g 400.0  28.2 Low  CM   Specimen Collected: 10/12/22 12:20 PM Last Resulted: 10/14/22  1:07 PM        IMAGING:  CT ABDOMEN AND PELVIS WITH CONTRAST     Clinical indication: History of hysterectomy with increased output from the   patient's surgical drain.     Technique: Automated exposure control was utilized to reduce radiation   dose.  The patient received an IV dose of 100 cc Isovue-300.     Comparison is made to a prior examination November 2, 2021.     FINDINGS: A surgical  drain is identified terminating near the gallbladder   fossa. The drain appears to terminate prior to an area of inflammation and   a small volume of fluid seen in the gallbladder fossa along the inferior   margin of the liver. This fluid collection has mildly increased from the   previous study.     There is trace free fluid in the pelvis which has decreased from the prior   study. The bowel is normal in course and caliber.     The lung bases demonstrate atelectasis which is similar to the prior study.   The spleen, liver parenchyma, pancreas, and adrenal glands are   unremarkable. The kidneys are symmetric in size and enhancement with no   hydronephrosis.     IMPRESSION:     1.  Increased fluid in the gallbladder fossa and along the inferior margin   of the left hepatic lobe. There is a surgical drain which does not appear   to go deep enough to address this accumulating fluid.        MRCP     INDICATION:  Status post laparoscopic cholecystectomy.  Evaluate for   retained stone.     COMPARISON: CT abdomen from November 03, 2021.     TECHNIQUE: T1, T2, diffusion and MRCP images are obtained of the abdomen.     FINDINGS: A small amount of fluid adjacent to the left hepatic lobe and in   the gallbladder fossa.     Liver: No discrete hepatic mass identified.     Spleen: Normal.     Pancreas: Normal.     Kidneys: Small right renal cortical cyst.     Bile ducts: Intrahepatic and extrahepatic bile ducts not dilated.  No   filling defects identified.     IMPRESSION:     1.  No intrahepatic or extrahepatic biliary duct dilation.   2.  Small amount of fluid left adjacent to the left hepatic lobe and in the   gallbladder fossa.  Could be due to postoperative seroma or infection or   bile leak.  These fluid collections are unchanged compared to CT from   November 03, 2021.      Liver MRI with secretin enhanced MRCP: 2/28/2022     COMPARISON: MRCP 11/9/2021, CT abdomen and pelvis 11/2/2011     HISTORY: Recurrent  pancreatitis     TECHNIQUE: Coronal T2 HASTE, sagittal T2 HASTE, axial T2 STIR, axial  PHONG T2, In-phase and Out-of-phase axial breath-hold FLASH,  diffusion-weighted, and T1-weighted VIBE axial fat saturation images  were obtained. Thick and thin slab heavily T2-weighted MRCP images  were obtained. 3-D reformatted images were created by the  technologist. Following administration of secretin, T2-weighted HASTE  images were obtained at 1 minute intervals to 7 minutes, and an  additional 10 minutes image was also obtained.      Contrast and medications: 11 mL Gadavist     FINDINGS:   MRCP:      There is no pancreas divisum. The pancreatic duct does not appear  abnormally dilated, and no sidebranches are visualized .      Pancreatic duct measures 2 mm prior to administration of secretin.  Following administration of secretin, it dilates to maximum diameter  of 4 mm.  At 10 minutes following secretin administration, the  diameter is 2 mm.     There is normal exocrine function, with contrast seen in the third  portion of the duodenum at 10 minutes following secretin  administration.     No intra-or extra hepatic biliary dilation.  The common bile duct  measures 6 mm.      Pancreas: No focal pancreatic lesion.  Pancreas maintains normal T1  signal.     Liver: Diffuse hepatic steatosis. No abnormal iron deposition. No  suspicious focal hepatic lesion.     Gallbladder: Postsurgical change of cholecystectomy with residual  fluid noted within the pancreatic bed. Spleen: Unremarkable     Kidneys: Bilaterally symmetrically enhancing kidneys. No renal lesion.  No hydronephrosis. No hydroureter. Small T2 hyperintense cysts  bilaterally.     Adrenal glands:  Unremarkable.       Bowel: The visualized large and small bowel is normal in caliber. No  abnormal bowel wall thickening or enhancement. Postsurgical changes of  Alon-en-Y.     Lymph nodes: Multiple prominent periportal lymph nodes, for example  prominent 1.7 cm periportal  lymph node series 35 image 41.     Blood vessels: Abdominal aorta and major abdominal arterial vessels  are patent and nonaneurysmal. Left retroaortic renal vein. The portal  vein is patent.     Lung bases: Unremarkable.     Bones and soft tissues: No acute osseous lesions.     Mesentery and abdominal wall: Unremarkable     Ascites: None                                                                      IMPRESSION:  1. No acute findings in the abdomen or pelvis.  2. Normal response to secretin. No pancreatic divisum.  3. Postsurgical changes of cholecystectomy with residual fluid in the  gallbladder bed.  4. Multiple periportal lymph nodes, presumably reactive.         Sincerely,    Marco Almonte MD

## 2023-02-11 ENCOUNTER — HEALTH MAINTENANCE LETTER (OUTPATIENT)
Age: 58
End: 2023-02-11

## 2024-03-09 ENCOUNTER — HEALTH MAINTENANCE LETTER (OUTPATIENT)
Age: 59
End: 2024-03-09

## 2025-03-16 ENCOUNTER — HEALTH MAINTENANCE LETTER (OUTPATIENT)
Age: 60
End: 2025-03-16